# Patient Record
Sex: MALE | Race: WHITE | Employment: UNEMPLOYED | URBAN - NONMETROPOLITAN AREA
[De-identification: names, ages, dates, MRNs, and addresses within clinical notes are randomized per-mention and may not be internally consistent; named-entity substitution may affect disease eponyms.]

---

## 2019-02-27 ENCOUNTER — HOSPITAL ENCOUNTER (EMERGENCY)
Age: 29
Discharge: HOME | End: 2019-02-27
Attending: EMERGENCY MEDICINE
Payer: MEDICAID

## 2019-02-27 VITALS
TEMPERATURE: 98.7 F | SYSTOLIC BLOOD PRESSURE: 138 MMHG | DIASTOLIC BLOOD PRESSURE: 93 MMHG | RESPIRATION RATE: 17 BRPM | HEART RATE: 78 BPM | OXYGEN SATURATION: 98 % | BODY MASS INDEX: 20.89 KG/M2 | HEIGHT: 66 IN | WEIGHT: 130 LBS

## 2019-02-27 DIAGNOSIS — K52.9 GASTROENTERITIS: Primary | ICD-10-CM

## 2019-02-27 LAB
ALBUMIN SERPL-MCNC: 4.5 G/DL (ref 3.5–5.1)
ALP BLD-CCNC: 78 U/L (ref 38–126)
ALT SERPL-CCNC: 282 U/L (ref 11–66)
ANION GAP SERPL CALCULATED.3IONS-SCNC: 13 MEQ/L (ref 8–16)
AST SERPL-CCNC: 102 U/L (ref 5–40)
BASOPHILS # BLD: 0.6 %
BASOPHILS ABSOLUTE: 0.1 THOU/MM3 (ref 0–0.1)
BILIRUB SERPL-MCNC: 0.6 MG/DL (ref 0.3–1.2)
BILIRUBIN DIRECT: < 0.2 MG/DL (ref 0–0.3)
BUN BLDV-MCNC: 20 MG/DL (ref 7–22)
CALCIUM SERPL-MCNC: 9.6 MG/DL (ref 8.5–10.5)
CHLORIDE BLD-SCNC: 100 MEQ/L (ref 98–111)
CO2: 26 MEQ/L (ref 23–33)
CREAT SERPL-MCNC: 0.7 MG/DL (ref 0.4–1.2)
EKG ATRIAL RATE: 67 BPM
EKG P AXIS: 41 DEGREES
EKG P-R INTERVAL: 140 MS
EKG Q-T INTERVAL: 406 MS
EKG QRS DURATION: 96 MS
EKG QTC CALCULATION (BAZETT): 429 MS
EKG R AXIS: 113 DEGREES
EKG T AXIS: 50 DEGREES
EKG VENTRICULAR RATE: 67 BPM
EOSINOPHIL # BLD: 1.7 %
EOSINOPHILS ABSOLUTE: 0.1 THOU/MM3 (ref 0–0.4)
ERYTHROCYTE [DISTWIDTH] IN BLOOD BY AUTOMATED COUNT: 13.2 % (ref 11.5–14.5)
ERYTHROCYTE [DISTWIDTH] IN BLOOD BY AUTOMATED COUNT: 43 FL (ref 35–45)
ETHYL ALCOHOL, SERUM: < 0.01 %
GFR SERPL CREATININE-BSD FRML MDRD: > 90 ML/MIN/1.73M2
GLUCOSE BLD-MCNC: 110 MG/DL (ref 70–108)
HCT VFR BLD CALC: 45.2 % (ref 42–52)
HEMOGLOBIN: 15.2 GM/DL (ref 14–18)
IMMATURE GRANS (ABS): 0.02 THOU/MM3 (ref 0–0.07)
IMMATURE GRANULOCYTES: 0.2 %
LIPASE: 15.4 U/L (ref 5.6–51.3)
LYMPHOCYTES # BLD: 16.5 %
LYMPHOCYTES ABSOLUTE: 1.4 THOU/MM3 (ref 1–4.8)
MAGNESIUM: 1.7 MG/DL (ref 1.6–2.4)
MCH RBC QN AUTO: 30.3 PG (ref 26–33)
MCHC RBC AUTO-ENTMCNC: 33.6 GM/DL (ref 32.2–35.5)
MCV RBC AUTO: 90 FL (ref 80–94)
MONOCYTES # BLD: 7.3 %
MONOCYTES ABSOLUTE: 0.6 THOU/MM3 (ref 0.4–1.3)
NUCLEATED RED BLOOD CELLS: 0 /100 WBC
OSMOLALITY CALCULATION: 280.8 MOSMOL/KG (ref 275–300)
PLATELET # BLD: 247 THOU/MM3 (ref 130–400)
PMV BLD AUTO: 10.4 FL (ref 9.4–12.4)
POTASSIUM SERPL-SCNC: 4.4 MEQ/L (ref 3.5–5.2)
RBC # BLD: 5.02 MILL/MM3 (ref 4.7–6.1)
SEG NEUTROPHILS: 73.7 %
SEGMENTED NEUTROPHILS ABSOLUTE COUNT: 6.2 THOU/MM3 (ref 1.8–7.7)
SODIUM BLD-SCNC: 139 MEQ/L (ref 135–145)
T4 FREE: 1.24 NG/DL (ref 0.93–1.76)
TOTAL PROTEIN: 7.2 G/DL (ref 6.1–8)
TROPONIN T: < 0.01 NG/ML
TSH SERPL DL<=0.05 MIU/L-ACNC: 0.32 UIU/ML (ref 0.4–4.2)
WBC # BLD: 8.4 THOU/MM3 (ref 4.8–10.8)

## 2019-02-27 PROCEDURE — G0480 DRUG TEST DEF 1-7 CLASSES: HCPCS

## 2019-02-27 PROCEDURE — 82248 BILIRUBIN DIRECT: CPT

## 2019-02-27 PROCEDURE — 83690 ASSAY OF LIPASE: CPT

## 2019-02-27 PROCEDURE — 99284 EMERGENCY DEPT VISIT MOD MDM: CPT

## 2019-02-27 PROCEDURE — 85025 COMPLETE CBC W/AUTO DIFF WBC: CPT

## 2019-02-27 PROCEDURE — 80053 COMPREHEN METABOLIC PANEL: CPT

## 2019-02-27 PROCEDURE — 6370000000 HC RX 637 (ALT 250 FOR IP): Performed by: EMERGENCY MEDICINE

## 2019-02-27 PROCEDURE — 84484 ASSAY OF TROPONIN QUANT: CPT

## 2019-02-27 PROCEDURE — 84443 ASSAY THYROID STIM HORMONE: CPT

## 2019-02-27 PROCEDURE — 96374 THER/PROPH/DIAG INJ IV PUSH: CPT

## 2019-02-27 PROCEDURE — 36415 COLL VENOUS BLD VENIPUNCTURE: CPT

## 2019-02-27 PROCEDURE — 2580000003 HC RX 258: Performed by: EMERGENCY MEDICINE

## 2019-02-27 PROCEDURE — 93005 ELECTROCARDIOGRAM TRACING: CPT | Performed by: EMERGENCY MEDICINE

## 2019-02-27 PROCEDURE — 96375 TX/PRO/DX INJ NEW DRUG ADDON: CPT

## 2019-02-27 PROCEDURE — 83735 ASSAY OF MAGNESIUM: CPT

## 2019-02-27 PROCEDURE — 84439 ASSAY OF FREE THYROXINE: CPT

## 2019-02-27 RX ORDER — ONDANSETRON 4 MG/1
4 TABLET, ORALLY DISINTEGRATING ORAL ONCE
Status: COMPLETED | OUTPATIENT
Start: 2019-02-27 | End: 2019-02-27

## 2019-02-27 RX ORDER — 0.9 % SODIUM CHLORIDE 0.9 %
1000 INTRAVENOUS SOLUTION INTRAVENOUS ONCE
Status: COMPLETED | OUTPATIENT
Start: 2019-02-27 | End: 2019-02-27

## 2019-02-27 RX ORDER — ONDANSETRON 4 MG/1
4 TABLET, FILM COATED ORAL 3 TIMES DAILY PRN
Qty: 30 TABLET | Refills: 0 | Status: ON HOLD | OUTPATIENT
Start: 2019-02-27 | End: 2019-07-23

## 2019-02-27 RX ORDER — PANTOPRAZOLE SODIUM 40 MG/1
40 TABLET, DELAYED RELEASE ORAL ONCE
Status: COMPLETED | OUTPATIENT
Start: 2019-02-27 | End: 2019-02-27

## 2019-02-27 RX ORDER — PANTOPRAZOLE SODIUM 40 MG/1
40 TABLET, DELAYED RELEASE ORAL
Qty: 30 TABLET | Refills: 0 | Status: ON HOLD | OUTPATIENT
Start: 2019-02-27 | End: 2019-07-23

## 2019-02-27 RX ADMIN — PANTOPRAZOLE SODIUM 40 MG: 40 TABLET, DELAYED RELEASE ORAL at 21:36

## 2019-02-27 RX ADMIN — SODIUM CHLORIDE 1000 ML: 9 INJECTION, SOLUTION INTRAVENOUS at 21:36

## 2019-02-27 RX ADMIN — ONDANSETRON 4 MG: 4 TABLET, ORALLY DISINTEGRATING ORAL at 21:22

## 2019-02-27 ASSESSMENT — ENCOUNTER SYMPTOMS
VOICE CHANGE: 0
EYE ITCHING: 0
NAUSEA: 1
BLOOD IN STOOL: 0
RHINORRHEA: 0
ABDOMINAL PAIN: 0
EYE DISCHARGE: 0
EYE PAIN: 0
CONSTIPATION: 0
CHOKING: 0
PHOTOPHOBIA: 0
EYE REDNESS: 0
ABDOMINAL DISTENTION: 0
BACK PAIN: 0
WHEEZING: 0
DIARRHEA: 1
VOMITING: 1
SORE THROAT: 0
SINUS PRESSURE: 0
CHEST TIGHTNESS: 0
TROUBLE SWALLOWING: 0
COUGH: 0
SHORTNESS OF BREATH: 0

## 2019-02-27 ASSESSMENT — PAIN DESCRIPTION - ORIENTATION: ORIENTATION: LOWER

## 2019-02-27 ASSESSMENT — PAIN DESCRIPTION - LOCATION: LOCATION: ABDOMEN

## 2019-02-27 ASSESSMENT — PAIN DESCRIPTION - PAIN TYPE: TYPE: ACUTE PAIN

## 2019-02-27 ASSESSMENT — PAIN SCALES - GENERAL: PAINLEVEL_OUTOF10: 6

## 2019-02-28 PROCEDURE — 93010 ELECTROCARDIOGRAM REPORT: CPT | Performed by: NUCLEAR MEDICINE

## 2019-07-23 ENCOUNTER — HOSPITAL ENCOUNTER (OUTPATIENT)
Age: 29
Setting detail: OBSERVATION
Discharge: LEFT BEFORE DISCHARGE | End: 2019-07-24
Attending: EMERGENCY MEDICINE | Admitting: PSYCHIATRY & NEUROLOGY
Payer: MEDICAID

## 2019-07-23 DIAGNOSIS — F11.20 CONTINUOUS OPIOID DEPENDENCE (HCC): Primary | ICD-10-CM

## 2019-07-23 LAB
ALBUMIN SERPL-MCNC: 4.1 G/DL (ref 3.5–5.1)
ALP BLD-CCNC: 73 U/L (ref 38–126)
ALT SERPL-CCNC: 84 U/L (ref 11–66)
AMPHETAMINE+METHAMPHETAMINE URINE SCREEN: NEGATIVE
ANION GAP SERPL CALCULATED.3IONS-SCNC: 11 MEQ/L (ref 8–16)
AST SERPL-CCNC: 42 U/L (ref 5–40)
BARBITURATE QUANTITATIVE URINE: NEGATIVE
BASOPHILS # BLD: 0.8 %
BASOPHILS ABSOLUTE: 0.1 THOU/MM3 (ref 0–0.1)
BENZODIAZEPINE QUANTITATIVE URINE: POSITIVE
BILIRUB SERPL-MCNC: 0.5 MG/DL (ref 0.3–1.2)
BILIRUBIN DIRECT: < 0.2 MG/DL (ref 0–0.3)
BILIRUBIN URINE: ABNORMAL
BLOOD, URINE: NEGATIVE
BUN BLDV-MCNC: 20 MG/DL (ref 7–22)
CALCIUM SERPL-MCNC: 9 MG/DL (ref 8.5–10.5)
CANNABINOID QUANTITATIVE URINE: POSITIVE
CHARACTER, URINE: CLEAR
CHLORIDE BLD-SCNC: 105 MEQ/L (ref 98–111)
CO2: 26 MEQ/L (ref 23–33)
COCAINE METABOLITE QUANTITATIVE URINE: NEGATIVE
COLOR: YELLOW
CREAT SERPL-MCNC: 0.9 MG/DL (ref 0.4–1.2)
EOSINOPHIL # BLD: 2.8 %
EOSINOPHILS ABSOLUTE: 0.2 THOU/MM3 (ref 0–0.4)
ERYTHROCYTE [DISTWIDTH] IN BLOOD BY AUTOMATED COUNT: 12.8 % (ref 11.5–14.5)
ERYTHROCYTE [DISTWIDTH] IN BLOOD BY AUTOMATED COUNT: 43.2 FL (ref 35–45)
ETHYL ALCOHOL, SERUM: < 0.01 %
GFR SERPL CREATININE-BSD FRML MDRD: > 90 ML/MIN/1.73M2
GLUCOSE BLD-MCNC: 143 MG/DL (ref 70–108)
GLUCOSE URINE: NEGATIVE MG/DL
HCT VFR BLD CALC: 43.1 % (ref 42–52)
HEMOGLOBIN: 13.9 GM/DL (ref 14–18)
ICTOTEST: NEGATIVE
IMMATURE GRANS (ABS): 0.02 THOU/MM3 (ref 0–0.07)
IMMATURE GRANULOCYTES: 0.3 %
KETONES, URINE: NEGATIVE
LEUKOCYTE ESTERASE, URINE: NEGATIVE
LIPASE: 41.3 U/L (ref 5.6–51.3)
LYMPHOCYTES # BLD: 28.2 %
LYMPHOCYTES ABSOLUTE: 2.1 THOU/MM3 (ref 1–4.8)
MAGNESIUM: 2.1 MG/DL (ref 1.6–2.4)
MCH RBC QN AUTO: 30 PG (ref 26–33)
MCHC RBC AUTO-ENTMCNC: 32.3 GM/DL (ref 32.2–35.5)
MCV RBC AUTO: 92.9 FL (ref 80–94)
MONOCYTES # BLD: 7.2 %
MONOCYTES ABSOLUTE: 0.5 THOU/MM3 (ref 0.4–1.3)
NITRITE, URINE: NEGATIVE
NUCLEATED RED BLOOD CELLS: 0 /100 WBC
OPIATES, URINE: POSITIVE
OSMOLALITY CALCULATION: 288.2 MOSMOL/KG (ref 275–300)
OXYCODONE: NEGATIVE
PH UA: 6 (ref 5–9)
PHENCYCLIDINE QUANTITATIVE URINE: NEGATIVE
PLATELET # BLD: 254 THOU/MM3 (ref 130–400)
PMV BLD AUTO: 10.3 FL (ref 9.4–12.4)
POTASSIUM SERPL-SCNC: 4.6 MEQ/L (ref 3.5–5.2)
PROTEIN UA: NEGATIVE
RBC # BLD: 4.64 MILL/MM3 (ref 4.7–6.1)
SEG NEUTROPHILS: 60.7 %
SEGMENTED NEUTROPHILS ABSOLUTE COUNT: 4.6 THOU/MM3 (ref 1.8–7.7)
SODIUM BLD-SCNC: 142 MEQ/L (ref 135–145)
SPECIFIC GRAVITY, URINE: > 1.03 (ref 1–1.03)
TOTAL PROTEIN: 6.7 G/DL (ref 6.1–8)
TSH SERPL DL<=0.05 MIU/L-ACNC: 0.56 UIU/ML (ref 0.4–4.2)
UROBILINOGEN, URINE: 1 EU/DL (ref 0–1)
WBC # BLD: 7.5 THOU/MM3 (ref 4.8–10.8)

## 2019-07-23 PROCEDURE — 86803 HEPATITIS C AB TEST: CPT

## 2019-07-23 PROCEDURE — 99285 EMERGENCY DEPT VISIT HI MDM: CPT

## 2019-07-23 PROCEDURE — G0378 HOSPITAL OBSERVATION PER HR: HCPCS

## 2019-07-23 PROCEDURE — 83735 ASSAY OF MAGNESIUM: CPT

## 2019-07-23 PROCEDURE — 87522 HEPATITIS C REVRS TRNSCRPJ: CPT

## 2019-07-23 PROCEDURE — 82248 BILIRUBIN DIRECT: CPT

## 2019-07-23 PROCEDURE — 83690 ASSAY OF LIPASE: CPT

## 2019-07-23 PROCEDURE — 85025 COMPLETE CBC W/AUTO DIFF WBC: CPT

## 2019-07-23 PROCEDURE — 80307 DRUG TEST PRSMV CHEM ANLYZR: CPT

## 2019-07-23 PROCEDURE — 1200000003 HC TELEMETRY R&B

## 2019-07-23 PROCEDURE — 84443 ASSAY THYROID STIM HORMONE: CPT

## 2019-07-23 PROCEDURE — G0480 DRUG TEST DEF 1-7 CLASSES: HCPCS

## 2019-07-23 PROCEDURE — 36415 COLL VENOUS BLD VENIPUNCTURE: CPT

## 2019-07-23 PROCEDURE — 80053 COMPREHEN METABOLIC PANEL: CPT

## 2019-07-23 PROCEDURE — 81003 URINALYSIS AUTO W/O SCOPE: CPT

## 2019-07-23 RX ORDER — BUPRENORPHINE 2 MG/1
2 TABLET SUBLINGUAL PRN
Status: DISCONTINUED | OUTPATIENT
Start: 2019-07-23 | End: 2019-07-24 | Stop reason: HOSPADM

## 2019-07-23 RX ORDER — PROMETHAZINE HYDROCHLORIDE 25 MG/1
25 TABLET ORAL EVERY 6 HOURS PRN
Status: DISCONTINUED | OUTPATIENT
Start: 2019-07-23 | End: 2019-07-24 | Stop reason: HOSPADM

## 2019-07-23 RX ORDER — TRAZODONE HYDROCHLORIDE 50 MG/1
50 TABLET ORAL NIGHTLY PRN
Status: DISCONTINUED | OUTPATIENT
Start: 2019-07-24 | End: 2019-07-24 | Stop reason: HOSPADM

## 2019-07-23 RX ORDER — DICYCLOMINE HCL 20 MG
20 TABLET ORAL EVERY 6 HOURS PRN
Status: DISCONTINUED | OUTPATIENT
Start: 2019-07-23 | End: 2019-07-24 | Stop reason: HOSPADM

## 2019-07-23 RX ORDER — CLONIDINE HYDROCHLORIDE 0.1 MG/1
0.1 TABLET ORAL PRN
Status: DISCONTINUED | OUTPATIENT
Start: 2019-07-23 | End: 2019-07-24 | Stop reason: HOSPADM

## 2019-07-23 RX ORDER — GABAPENTIN 300 MG/1
300 CAPSULE ORAL EVERY 8 HOURS PRN
Status: DISCONTINUED | OUTPATIENT
Start: 2019-07-23 | End: 2019-07-24 | Stop reason: HOSPADM

## 2019-07-23 RX ORDER — IBUPROFEN 800 MG/1
800 TABLET ORAL EVERY 8 HOURS PRN
Status: DISCONTINUED | OUTPATIENT
Start: 2019-07-23 | End: 2019-07-24 | Stop reason: HOSPADM

## 2019-07-23 RX ORDER — HYDROXYZINE PAMOATE 50 MG/1
50 CAPSULE ORAL EVERY 8 HOURS PRN
Status: DISCONTINUED | OUTPATIENT
Start: 2019-07-23 | End: 2019-07-24 | Stop reason: HOSPADM

## 2019-07-23 ASSESSMENT — ENCOUNTER SYMPTOMS
SINUS PRESSURE: 0
ABDOMINAL PAIN: 0
CHEST TIGHTNESS: 0
EYE DISCHARGE: 0
BACK PAIN: 0
DIARRHEA: 0
VOICE CHANGE: 0
EYE PAIN: 0
TROUBLE SWALLOWING: 0
SHORTNESS OF BREATH: 0
CONSTIPATION: 0
PHOTOPHOBIA: 0
EYE REDNESS: 0
COUGH: 0
WHEEZING: 0
EYE ITCHING: 0
CHOKING: 0
SORE THROAT: 0
VOMITING: 0
BLOOD IN STOOL: 0
RHINORRHEA: 0
NAUSEA: 0
ABDOMINAL DISTENTION: 0

## 2019-07-23 ASSESSMENT — PAIN SCALES - GENERAL
PAINLEVEL_OUTOF10: 4
PAINLEVEL_OUTOF10: 0

## 2019-07-23 ASSESSMENT — PAIN DESCRIPTION - LOCATION: LOCATION: BACK

## 2019-07-23 ASSESSMENT — PAIN DESCRIPTION - PAIN TYPE: TYPE: CHRONIC PAIN

## 2019-07-23 ASSESSMENT — PAIN DESCRIPTION - ORIENTATION: ORIENTATION: LOWER

## 2019-07-24 VITALS
OXYGEN SATURATION: 97 % | WEIGHT: 128.6 LBS | HEART RATE: 82 BPM | HEIGHT: 65 IN | RESPIRATION RATE: 18 BRPM | TEMPERATURE: 98.6 F | SYSTOLIC BLOOD PRESSURE: 120 MMHG | DIASTOLIC BLOOD PRESSURE: 80 MMHG | BODY MASS INDEX: 21.43 KG/M2

## 2019-07-24 LAB
ALBUMIN SERPL-MCNC: 3.7 G/DL (ref 3.5–5.1)
ALP BLD-CCNC: 72 U/L (ref 38–126)
ALT SERPL-CCNC: 78 U/L (ref 11–66)
ANION GAP SERPL CALCULATED.3IONS-SCNC: 8 MEQ/L (ref 8–16)
AST SERPL-CCNC: 37 U/L (ref 5–40)
BASOPHILS # BLD: 0.9 %
BASOPHILS ABSOLUTE: 0.1 THOU/MM3 (ref 0–0.1)
BILIRUB SERPL-MCNC: 0.5 MG/DL (ref 0.3–1.2)
BUN BLDV-MCNC: 19 MG/DL (ref 7–22)
CALCIUM SERPL-MCNC: 8.8 MG/DL (ref 8.5–10.5)
CHLORIDE BLD-SCNC: 107 MEQ/L (ref 98–111)
CO2: 28 MEQ/L (ref 23–33)
CREAT SERPL-MCNC: 0.9 MG/DL (ref 0.4–1.2)
EOSINOPHIL # BLD: 3 %
EOSINOPHILS ABSOLUTE: 0.2 THOU/MM3 (ref 0–0.4)
ERYTHROCYTE [DISTWIDTH] IN BLOOD BY AUTOMATED COUNT: 12.7 % (ref 11.5–14.5)
ERYTHROCYTE [DISTWIDTH] IN BLOOD BY AUTOMATED COUNT: 42.6 FL (ref 35–45)
GFR SERPL CREATININE-BSD FRML MDRD: > 90 ML/MIN/1.73M2
GLUCOSE BLD-MCNC: 128 MG/DL (ref 70–108)
HCT VFR BLD CALC: 41.9 % (ref 42–52)
HEMOGLOBIN: 13.8 GM/DL (ref 14–18)
HEPATITIS C ANTIBODY: ABNORMAL
IMMATURE GRANS (ABS): 0.01 THOU/MM3 (ref 0–0.07)
IMMATURE GRANULOCYTES: 0.1 %
LYMPHOCYTES # BLD: 34.2 %
LYMPHOCYTES ABSOLUTE: 2.4 THOU/MM3 (ref 1–4.8)
MCH RBC QN AUTO: 30.3 PG (ref 26–33)
MCHC RBC AUTO-ENTMCNC: 32.9 GM/DL (ref 32.2–35.5)
MCV RBC AUTO: 92.1 FL (ref 80–94)
MONOCYTES # BLD: 6.2 %
MONOCYTES ABSOLUTE: 0.4 THOU/MM3 (ref 0.4–1.3)
NUCLEATED RED BLOOD CELLS: 0 /100 WBC
PLATELET # BLD: 232 THOU/MM3 (ref 130–400)
PMV BLD AUTO: 10.3 FL (ref 9.4–12.4)
POTASSIUM SERPL-SCNC: 4.6 MEQ/L (ref 3.5–5.2)
RBC # BLD: 4.55 MILL/MM3 (ref 4.7–6.1)
SEG NEUTROPHILS: 55.6 %
SEGMENTED NEUTROPHILS ABSOLUTE COUNT: 3.9 THOU/MM3 (ref 1.8–7.7)
SODIUM BLD-SCNC: 143 MEQ/L (ref 135–145)
TOTAL PROTEIN: 6.2 G/DL (ref 6.1–8)
TSH SERPL DL<=0.05 MIU/L-ACNC: 0.53 UIU/ML (ref 0.4–4.2)
WBC # BLD: 7 THOU/MM3 (ref 4.8–10.8)

## 2019-07-24 PROCEDURE — 99219 PR INITIAL OBSERVATION CARE/DAY 50 MINUTES: CPT | Performed by: PHYSICIAN ASSISTANT

## 2019-07-24 PROCEDURE — 6370000000 HC RX 637 (ALT 250 FOR IP): Performed by: PSYCHIATRY & NEUROLOGY

## 2019-07-24 PROCEDURE — G0378 HOSPITAL OBSERVATION PER HR: HCPCS

## 2019-07-24 RX ORDER — NICOTINE 21 MG/24HR
1 PATCH, TRANSDERMAL 24 HOURS TRANSDERMAL DAILY
Status: DISCONTINUED | OUTPATIENT
Start: 2019-07-24 | End: 2019-07-24 | Stop reason: HOSPADM

## 2019-07-24 RX ADMIN — Medication 2 MG: at 00:16

## 2019-07-24 RX ADMIN — HYDROXYZINE PAMOATE 50 MG: 50 CAPSULE ORAL at 08:40

## 2019-07-24 RX ADMIN — IBUPROFEN 800 MG: 800 TABLET, FILM COATED ORAL at 00:16

## 2019-07-24 RX ADMIN — GABAPENTIN 300 MG: 300 CAPSULE ORAL at 00:17

## 2019-07-24 RX ADMIN — CLONIDINE HYDROCHLORIDE 0.1 MG: 0.1 TABLET ORAL at 00:16

## 2019-07-24 RX ADMIN — PROMETHAZINE HYDROCHLORIDE 25 MG: 25 TABLET ORAL at 00:17

## 2019-07-24 RX ADMIN — GABAPENTIN 300 MG: 300 CAPSULE ORAL at 08:40

## 2019-07-24 RX ADMIN — HYDROXYZINE PAMOATE 50 MG: 50 CAPSULE ORAL at 00:17

## 2019-07-24 RX ADMIN — IBUPROFEN 800 MG: 800 TABLET, FILM COATED ORAL at 08:41

## 2019-07-24 ASSESSMENT — PAIN DESCRIPTION - ORIENTATION
ORIENTATION: LOWER
ORIENTATION: RIGHT;LEFT
ORIENTATION: LOWER

## 2019-07-24 ASSESSMENT — PAIN SCALES - GENERAL
PAINLEVEL_OUTOF10: 0
PAINLEVEL_OUTOF10: 4
PAINLEVEL_OUTOF10: 0
PAINLEVEL_OUTOF10: 0
PAINLEVEL_OUTOF10: 2
PAINLEVEL_OUTOF10: 0
PAINLEVEL_OUTOF10: 0

## 2019-07-24 ASSESSMENT — PAIN DESCRIPTION - ONSET
ONSET: ON-GOING

## 2019-07-24 ASSESSMENT — PAIN DESCRIPTION - DESCRIPTORS
DESCRIPTORS: ACHING

## 2019-07-24 ASSESSMENT — PAIN DESCRIPTION - PAIN TYPE
TYPE: ACUTE PAIN
TYPE: ACUTE PAIN

## 2019-07-24 ASSESSMENT — PAIN - FUNCTIONAL ASSESSMENT
PAIN_FUNCTIONAL_ASSESSMENT: ACTIVITIES ARE NOT PREVENTED

## 2019-07-24 ASSESSMENT — PAIN DESCRIPTION - DIRECTION
RADIATING_TOWARDS: LOW BACK
RADIATING_TOWARDS: NOT RADIATING

## 2019-07-24 ASSESSMENT — PAIN DESCRIPTION - PROGRESSION
CLINICAL_PROGRESSION: NOT CHANGED

## 2019-07-24 ASSESSMENT — PAIN DESCRIPTION - LOCATION
LOCATION: ANKLE;BACK
LOCATION: BACK
LOCATION: BACK

## 2019-07-24 ASSESSMENT — PAIN DESCRIPTION - FREQUENCY
FREQUENCY: INTERMITTENT

## 2019-07-24 NOTE — ED PROVIDER NOTES
status: Not on file    Intimate partner violence:     Fear of current or ex partner: Not on file     Emotionally abused: Not on file     Physically abused: Not on file     Forced sexual activity: Not on file   Other Topics Concern    Not on file   Social History Narrative    Not on file       PHYSICAL EXAM     INITIAL VITALS:  height is 5' 5\" (1.651 m) and weight is 128 lb 9.6 oz (58.3 kg). His oral temperature is 98.1 °F (36.7 °C). His blood pressure is 143/87 (abnormal) and his pulse is 89. His respiration is 16 and oxygen saturation is 99%. Physical Exam   Constitutional: He is oriented to person, place, and time. He appears well-developed and well-nourished. No distress. HENT:   Head: Normocephalic and atraumatic. Right Ear: External ear normal.   Left Ear: External ear normal.   Nose: Nose normal.   Mouth/Throat: Oropharynx is clear and moist. No oropharyngeal exudate. Eyes: Pupils are equal, round, and reactive to light. Conjunctivae and EOM are normal. Right eye exhibits no discharge. Left eye exhibits no discharge. No scleral icterus. Neck: Normal range of motion. Neck supple. No JVD present. No tracheal deviation present. Cardiovascular: Normal rate, regular rhythm, normal heart sounds and intact distal pulses. Exam reveals no gallop and no friction rub. No murmur heard. Pulmonary/Chest: Effort normal and breath sounds normal. He has no wheezes. He has no rales. Abdominal: Soft. Bowel sounds are normal. He exhibits no mass. There is no tenderness. There is no rebound and no guarding. Musculoskeletal: Normal range of motion. He exhibits no edema or tenderness. Lymphadenopathy:     He has no cervical adenopathy. Neurological: He is alert and oriented to person, place, and time. He has normal reflexes. He displays normal reflexes. No cranial nerve deficit. He exhibits normal muscle tone. Coordination normal.   Skin: Skin is warm and dry. No rash noted. He is not diaphoretic.

## 2019-07-24 NOTE — ED TRIAGE NOTES
Presents to ED for detox of heroin and fentanyl. Pt states he last used at 1100 today and has not used since. Pt states he has been clean for several years due to his job, but he became weak today. States he feels the urge to use again and he wants help.

## 2019-07-24 NOTE — PLAN OF CARE
Problem: Coping - Ineffective, Individual:  Goal: Ability to identify and develop effective coping behavior will improve  Description  Ability to identify and develop effective coping behavior will improve  Outcome: Met This Shift  Open communication between nurse and patient  patient able to verbalize needs  Problem: Injury - Risk of, Substance Overdose:  Goal: Ability to remain free from injury will improve  Description  Ability to remain free from injury will improve  Outcome: Met This Shift  No injuries noted this shift   Problem: Mood - Altered:  Goal: Mood stable  Description  Mood stable  Outcome: Met This Shift   Mood rated 5/10.

## 2019-07-24 NOTE — H&P
Department of Psychiatry   Opioid Detoxification and Linkage   History and Physical Exam    CHIEF COMPLAINT:  Opioid Dependence    History obtained from:  patient, electronic medical record and family members    HISTORY OF PRESENT ILLNESS:    Yuni Amaya is a 26 y.o. male with a history of severe opioid dependence and depression who is admitted for medically assisted treatment of opiate dependence, as attempts at home detox were unmanageable and unsafe. He had an opiate overdose the day before he came here, was treated at [facility]. Opioid use started approximately 2 years ago. Recent daily use has been approximately 0.25 gm/d via IN route of administration. Last use was yesterday at 11am. Never used IV (+Hepatitic C antibody per testing yesterday). Longest period of sobriety was about a few months duration. He does not want Subutex or Suboxone, is considering Vivitrol. He states that he has seen psychiatry in the past for depression and anxiety. He has not had treatment for a few years and states that he has been stable. He would, however, like to get reconnected with a provider and a therapist  after discharge.     Patients opioid use has been causing significant stress and impairment in life, characterized by :  - larger amount of opioid used by the patient and whenever possible, using substances for longer duration  - patient has made attempts, unsuccessfully, to stop or cut down the abuse of opioid, in the past  - significant time is spent, by the patient, to obtain the drugs, almost every day  - patient is craving for opioids  - opioid abuse has significantly affected patient's relationship, social life, interpersonal relationship  - patient's social and recreational life has been negatively affected, secondary to opioid abuse  - patient continues to abuse opioid, despite the knowledge that it is affecting physical and psychological life  - patient has developed tolerance, as manifested by increased  Depression         Inpatient Detox recommended due to inability to safely manage detox in an outpatient setting or any lower level of care    TREATMENT PLAN    Detox with Clonidine only via COWS scoring   Patient was educated on the medications that were going to be used for detox. Risks vs benefits were discussed with the patient. Patient provided informed consent for these medications. Patient will work with social work and staff for aftercare/discharge plans: Dr. Kristen Quinteros for possible Vivitrol, +/- Dr. Maria Luz Mason vs [address] for mental health   Continue appropriate home meds. Estimated length of stay:  2-5 days  GENERAL PATIENT/FAMILY EDUCATION  Patient will understand basic signs and symptoms, Patient will understand benefits/risks and potential side effects from proposed meds and Patient will understand their role in recovery. Family is active in patient's care. Patient assets that may be helpful during treatment include: Intent to participate and engage in treatment, sufficient fund of knowledge and intellect to understand and utilize treatments.      Electronically signed by Joyce Rdz PA-C on 7/24/19 at 11:52 AM

## 2019-07-27 LAB
HCV QNT BY NAAT INTERPRETATION: DETECTED
HCV QNT BY NAAT IU/ML: ABNORMAL
HCV QNT BY NAAT LOG IU/ML: 5.75 LOG IU/ML

## 2019-09-29 ENCOUNTER — HOSPITAL ENCOUNTER (EMERGENCY)
Age: 29
Discharge: HOME | End: 2019-09-29
Attending: EMERGENCY MEDICINE
Payer: MEDICAID

## 2019-09-29 VITALS
RESPIRATION RATE: 12 BRPM | BODY MASS INDEX: 23.04 KG/M2 | SYSTOLIC BLOOD PRESSURE: 142 MMHG | OXYGEN SATURATION: 99 % | DIASTOLIC BLOOD PRESSURE: 88 MMHG | HEART RATE: 98 BPM | HEIGHT: 63 IN | TEMPERATURE: 98.1 F | WEIGHT: 130 LBS

## 2019-09-29 DIAGNOSIS — Z53.20 PATIENT LEFT BEFORE TREATMENT COMPLETED: ICD-10-CM

## 2019-09-29 DIAGNOSIS — Z59.00 HOMELESSNESS: ICD-10-CM

## 2019-09-29 DIAGNOSIS — F19.10 POLYSUBSTANCE ABUSE (HCC): Primary | ICD-10-CM

## 2019-09-29 LAB
AMPHETAMINE+METHAMPHETAMINE URINE SCREEN: NEGATIVE
BARBITURATE QUANTITATIVE URINE: NEGATIVE
BENZODIAZEPINE QUANTITATIVE URINE: POSITIVE
CANNABINOID QUANTITATIVE URINE: POSITIVE
COCAINE METABOLITE QUANTITATIVE URINE: POSITIVE
OPIATES, URINE: POSITIVE
OXYCODONE: NEGATIVE
PHENCYCLIDINE QUANTITATIVE URINE: NEGATIVE

## 2019-09-29 PROCEDURE — 80307 DRUG TEST PRSMV CHEM ANLYZR: CPT

## 2019-09-29 PROCEDURE — 99283 EMERGENCY DEPT VISIT LOW MDM: CPT

## 2019-09-29 SDOH — ECONOMIC STABILITY - HOUSING INSECURITY: HOMELESSNESS UNSPECIFIED: Z59.00

## 2019-09-29 ASSESSMENT — PAIN DESCRIPTION - DESCRIPTORS: DESCRIPTORS: OTHER (COMMENT)

## 2019-09-29 ASSESSMENT — ENCOUNTER SYMPTOMS
NAUSEA: 0
WHEEZING: 0
SHORTNESS OF BREATH: 0
ABDOMINAL PAIN: 0
COUGH: 0
SORE THROAT: 0
BACK PAIN: 0
EYE REDNESS: 0
VOMITING: 0
RHINORRHEA: 1
EYE DISCHARGE: 0
DIARRHEA: 0

## 2019-09-29 ASSESSMENT — PAIN SCALES - GENERAL: PAINLEVEL_OUTOF10: 6

## 2019-09-29 ASSESSMENT — PAIN DESCRIPTION - LOCATION: LOCATION: GENERALIZED

## 2019-09-29 ASSESSMENT — PAIN DESCRIPTION - FREQUENCY: FREQUENCY: CONTINUOUS

## 2020-10-26 ENCOUNTER — HOSPITAL ENCOUNTER (OUTPATIENT)
Dept: INTERVENTIONAL RADIOLOGY/VASCULAR | Age: 30
Discharge: HOME | End: 2020-10-26
Payer: MEDICAID

## 2020-10-26 PROCEDURE — 93971 EXTREMITY STUDY: CPT

## 2021-06-24 ENCOUNTER — NURSE TRIAGE (OUTPATIENT)
Dept: OTHER | Facility: CLINIC | Age: 31
End: 2021-06-24

## 2021-06-24 NOTE — TELEPHONE ENCOUNTER
Patient called [facility] with red flag complaint to establish care with Devin Santos. Brief description of triage: Pt states he has been having upper and lower extremity numbness, tingling and weakness for the past year that seems to be getting worse over the last month. Pt asked was there something acute that changed and pt stated he is falling apart and did not state anything was different. Pt states he is going to ER but wanted to make an appt with a physician. See assessment below. Pt did hang up during transfer to [facility] for scheduling. Writer attempted to call pt back he was not at number provided. Triage indicates for patient to be seen in the next 3 days. Pt advised if symptoms are acute to go to ED or [facility]. Care advice provided, patient verbalizes understanding; denies any other questions or concerns; instructed to call back for any new or worsening symptoms. Attention Provider: Thank you for allowing me to participate in the care of your patient. The patient was connected to triage in response to information provided to the [facility]. Please do not respond through this encounter as the response is not directed to a shared pool. Reason for Disposition   Weakness of arm or leg is a chronic symptom (recurrent or ongoing problem lasting > 4 weeks)    Answer Assessment - Initial Assessment Questions  1. SYMPTOM: \"What is the main symptom you are concerned about? \" (e.g., weakness, numbness)      Numbness weakness and pain in bilateral legs and arms    2. ONSET: \"When did this start? \" (minutes, hours, days; while sleeping)      About year a ago but is now getting worse about a month ago    3. LAST NORMAL: \"When was the last time you were normal (no symptoms)? \"      Year ago    4. PATTERN \"Does this come and go, or has it been constant since it started? \"  \"Is it present now? \"      Come and go in severity    5.  CARDIAC SYMPTOMS: \"Have you had any of the following symptoms: chest pain,

## 2021-06-28 ENCOUNTER — TELEPHONE (OUTPATIENT)
Dept: FAMILY MEDICINE CLINIC | Age: 31
End: 2021-06-28

## 2021-06-29 ENCOUNTER — TELEPHONE (OUTPATIENT)
Dept: FAMILY MEDICINE CLINIC | Age: 31
End: 2021-06-29

## 2021-06-30 NOTE — TELEPHONE ENCOUNTER
Spoke with pt states he would like to reschedule, then we were disconnected. I called back and got   LM to call the office to get rescheduled.

## 2021-10-20 ENCOUNTER — OFFICE VISIT (OUTPATIENT)
Dept: FAMILY MEDICINE CLINIC | Age: 31
End: 2021-10-20
Payer: MEDICAID

## 2021-10-20 VITALS
DIASTOLIC BLOOD PRESSURE: 80 MMHG | OXYGEN SATURATION: 98 % | WEIGHT: 122.4 LBS | BODY MASS INDEX: 21.69 KG/M2 | HEIGHT: 63 IN | RESPIRATION RATE: 18 BRPM | HEART RATE: 112 BPM | SYSTOLIC BLOOD PRESSURE: 124 MMHG | TEMPERATURE: 98 F

## 2021-10-20 DIAGNOSIS — R53.83 FATIGUE, UNSPECIFIED TYPE: ICD-10-CM

## 2021-10-20 DIAGNOSIS — R63.4 WEIGHT LOSS: Primary | ICD-10-CM

## 2021-10-20 DIAGNOSIS — M25.50 ARTHRALGIA, UNSPECIFIED JOINT: ICD-10-CM

## 2021-10-20 DIAGNOSIS — M54.32 BILATERAL SCIATICA: ICD-10-CM

## 2021-10-20 DIAGNOSIS — M54.31 BILATERAL SCIATICA: ICD-10-CM

## 2021-10-20 PROCEDURE — G8484 FLU IMMUNIZE NO ADMIN: HCPCS | Performed by: NURSE PRACTITIONER

## 2021-10-20 PROCEDURE — 99214 OFFICE O/P EST MOD 30 MIN: CPT | Performed by: NURSE PRACTITIONER

## 2021-10-20 PROCEDURE — G8420 CALC BMI NORM PARAMETERS: HCPCS | Performed by: NURSE PRACTITIONER

## 2021-10-20 PROCEDURE — 4004F PT TOBACCO SCREEN RCVD TLK: CPT | Performed by: NURSE PRACTITIONER

## 2021-10-20 PROCEDURE — 96372 THER/PROPH/DIAG INJ SC/IM: CPT | Performed by: NURSE PRACTITIONER

## 2021-10-20 PROCEDURE — G8427 DOCREV CUR MEDS BY ELIG CLIN: HCPCS | Performed by: NURSE PRACTITIONER

## 2021-10-20 RX ORDER — GABAPENTIN 100 MG/1
CAPSULE ORAL
Qty: 63 CAPSULE | Refills: 0 | Status: SHIPPED | OUTPATIENT
Start: 2021-10-20 | End: 2022-07-26 | Stop reason: SDUPTHER

## 2021-10-20 RX ORDER — PREDNISONE 20 MG/1
40 TABLET ORAL DAILY
Qty: 14 TABLET | Refills: 0 | Status: SHIPPED | OUTPATIENT
Start: 2021-10-20 | End: 2021-10-27

## 2021-10-20 RX ORDER — METHYLPREDNISOLONE ACETATE 40 MG/ML
60 INJECTION, SUSPENSION INTRA-ARTICULAR; INTRALESIONAL; INTRAMUSCULAR; SOFT TISSUE ONCE
Status: COMPLETED | OUTPATIENT
Start: 2021-10-20 | End: 2021-10-20

## 2021-10-20 RX ORDER — GABAPENTIN 800 MG/1
800 TABLET ORAL PRN
COMMUNITY
End: 2021-10-20

## 2021-10-20 RX ADMIN — METHYLPREDNISOLONE ACETATE 60 MG: 40 INJECTION, SUSPENSION INTRA-ARTICULAR; INTRALESIONAL; INTRAMUSCULAR; SOFT TISSUE at 14:44

## 2021-10-20 SDOH — ECONOMIC STABILITY: FOOD INSECURITY: WITHIN THE PAST 12 MONTHS, YOU WORRIED THAT YOUR FOOD WOULD RUN OUT BEFORE YOU GOT MONEY TO BUY MORE.: NEVER TRUE

## 2021-10-20 SDOH — ECONOMIC STABILITY: FOOD INSECURITY: WITHIN THE PAST 12 MONTHS, THE FOOD YOU BOUGHT JUST DIDN'T LAST AND YOU DIDN'T HAVE MONEY TO GET MORE.: NEVER TRUE

## 2021-10-20 ASSESSMENT — PATIENT HEALTH QUESTIONNAIRE - PHQ9
SUM OF ALL RESPONSES TO PHQ9 QUESTIONS 1 & 2: 0
1. LITTLE INTEREST OR PLEASURE IN DOING THINGS: 0
SUM OF ALL RESPONSES TO PHQ QUESTIONS 1-9: 0
SUM OF ALL RESPONSES TO PHQ QUESTIONS 1-9: 0
2. FEELING DOWN, DEPRESSED OR HOPELESS: 0
SUM OF ALL RESPONSES TO PHQ QUESTIONS 1-9: 0

## 2021-10-20 ASSESSMENT — SOCIAL DETERMINANTS OF HEALTH (SDOH): HOW HARD IS IT FOR YOU TO PAY FOR THE VERY BASICS LIKE FOOD, HOUSING, MEDICAL CARE, AND HEATING?: NOT HARD AT ALL

## 2021-10-20 NOTE — PROGRESS NOTES
[facility] [address]  [address] [address]  Dept: [phone]  Loc: [phone]  Visit Date: 10/20/2021      Chief Complaint:   Pablito Harris is a 27 y.o. male thatpresents for Weight Loss (unsure of the cause), Joint Pain, and Back Pain        HPI:  Varying weight by 20-30 lbs over the last year  No changes in appetite, reports eating well  Notes night sweats, fatigue, diffuse joint pain  +RUQ pain, swells at times    Back pain  Radiates down back of legs to toes  No weakness  Works as   No known injury    Reports diffuse joint pain  Takes Gabapentin as needed, gets it off the street  No injury that he knows of    The patient comes in alone today. History obtained from pt and medical records. I have reviewed the patient's past medical history, past surgical history, allergies,medications, social and family history and I have made updates where appropriate. Past Medical History:   Diagnosis Date    Depression        History reviewed. No pertinent surgical history. Social History     Tobacco Use    Smoking status: Current Every Day Smoker     Packs/day: 0.50     Years: 5.00     Pack years: 2.50     Types: Cigarettes    Smokeless tobacco: Never Used   Substance Use Topics    Alcohol use: No    Drug use: Yes     Types: Marijuana     Comment: hx heroin and fentanyl in the past, clean now       History reviewed. No pertinent family history. PHYSICAL EXAM:  /80   Pulse 112   Temp 98 °F (36.7 °C) (Infrared)   Resp 18   Ht 5' 3\" (1.6 m)   Wt 122 lb 6.4 oz (55.5 kg)   SpO2 98%   BMI 21.68 kg/m²     Physical Exam  Constitutional:       Appearance: Normal appearance. HENT:      Head: Normocephalic and atraumatic.       Right Ear: External ear normal.      Left Ear: External ear normal.      Nose: Nose normal.      Mouth/Throat:      Mouth: Mucous membranes are moist.   Eyes:      Conjunctiva/sclera: Conjunctivae normal. Cardiovascular:      Rate and Rhythm: Normal rate and regular rhythm. Pulses: Normal pulses. Heart sounds: Normal heart sounds. Pulmonary:      Effort: Pulmonary effort is normal.      Breath sounds: Normal breath sounds. Abdominal:      General: Bowel sounds are normal.      Palpations: Abdomen is soft. Tenderness: There is abdominal tenderness in the right upper quadrant. Musculoskeletal:         General: Normal range of motion. Cervical back: Normal range of motion. Skin:     General: Skin is warm and dry. Neurological:      General: No focal deficit present. Mental Status: He is alert and oriented to person, place, and time. Psychiatric:         Mood and Affect: Mood normal.         Behavior: Behavior normal.         ASSESSMENT & PLAN  [place] was seen today for weight loss, joint pain and back pain. Diagnoses and all orders for this visit:    Weight loss  -     CBC Auto Differential; Future  -     Comprehensive Metabolic Panel; Future  -     TSH with Reflex; Future  -     SIRISHA - Geri Saldaña MD, Gastroenterology, [facility]; Future    Fatigue, unspecified type  -     CBC Auto Differential; Future  -     Comprehensive Metabolic Panel; Future  -     TSH with Reflex; Future  -     SIRISHA - Geri Saldaña MD, Gastroenterology, [facility]; Future    Arthralgia, unspecified joint  -     gabapentin (NEURONTIN) 100 MG capsule; Take 1 tab PO qhs x 1 week, then take 1 tab PO BID x 1 week, then take 1 tab PO TID. -     predniSONE (DELTASONE) 20 MG tablet; Take 2 tablets by mouth daily for 7 days  -     methylPREDNISolone acetate (DEPO-MEDROL) injection 60 mg    Bilateral sciatica  -     predniSONE (DELTASONE) 20 MG tablet;  Take 2 tablets by mouth daily for 7 days  -     methylPREDNISolone acetate (DEPO-MEDROL) injection 60 mg      During review of chart, discovered positive Hep C test in 2019  With this and his RUQ pain and swelling, will refer to Dr. Sanjuanita Blank  Will need further work up and treatment     Treat back and joint pain with steroids   Ice prn  Heating pad prn  Ibuprofen BID with food  Push fluids    Check labs    Stop prn Gabapentin from friends  Will start a rx for this   Instructed him to take it consistently and as ordered  Will re-evaluate dose in 3 1/2 weeks    No follow-ups on file. Chico received counseling on the following healthy behaviors: nutrition, exercise and medication adherence  Reviewedprior labs and health maintenance. Continue current medications, diet and exercise. Discussed use, benefit, and side effects of prescribed medications. Barriers to medication compliance addressed. Patient given educationalmaterials - see patient instructions. All patient questions answered. Patient voiced understanding.      Electronically signed by KATINA Vital - CNP, APRN on 10/20/21 at 1:06 PM EDT

## 2021-10-20 NOTE — PATIENT INSTRUCTIONS
Needs followup with me in 3.5 weeks      Patient Education        Learning About Hepatitis C  What is hepatitis C? Hepatitis C is a disease caused by a virus that infects the liver. In time, it can lead to cirrhosis, liver cancer, or liver failure. Many people don't know that they have the virus until they already have some liver damage. This can take many years. Some people who get the infection have it for a short time (acute) and then get better. But most people who have it go on to develop long-term, or chronic, infection. Although hepatitis C can be very serious, most people can manage it and lead active, full lives. What happens when you have hepatitis C? Some people who get hepatitis C have it for a short time (acute infection) and then get better. But most people get long-term, or chronic, infection. This can lead to liver damage. Long-term hepatitis C often causes tiny scars in your liver. If you have a lot of scars, it becomes hard for your liver to work well. Over time, some people have more serious problems such as cirrhosis or liver cancer. What are the symptoms? Most people who have hepatitis C don't have symptoms. If there are symptoms, they may include fatigue, pain in the belly and joints, itchy skin, sore muscles, and dark urine. There may also be jaundice. This is a condition in which the skin and the whites of the eyes look yellow. How can you prevent hepatitis C? There is no vaccine to prevent the disease. Anyone who has hepatitis C can spread the virus to someone else. You can take steps to make infection less likely. · Don't share needles to inject drugs. · Make sure all tools and supplies are sterilized if you get a tattoo or body piercing, or have acupuncture. · Don't share anything that might have infected blood on it. This may include a toothbrush, razor, or nail clippers. · Use latex condoms during sex if you have HIV.  Also use latex condoms if you have multiple sex partners or a sexually transmitted infection. How is hepatitis C treated? · If you have acute hepatitis C, your doctor will probably prescribe medicine. · If you have chronic hepatitis C, your treatment depends on whether you have liver damage, other health problems you may have, and how much virus is in your body and what type it is. · You will need to see your doctor regularly to have blood tests to check your liver. Follow-up care is a key part of your treatment and safety. Be sure to make and go to all appointments, and call your doctor if you are having problems. It's also a good idea to know your test results and keep a list of the medicines you take. Where can you learn more? Go to [email or web address] org and sign in to your [employer] account. Enter C666 in the [employer] box to learn more about \"Learning About Hepatitis C. \"     If you do not have an account, please click on the \"Sign Up Now\" link. Current as of: July 1, 2021               Content Version: 13.0  © 2006-2021 Healthwise, Incorporated. Care instructions adapted under license by [facility] ([facility]). If you have questions about a medical condition or this instruction, always ask your healthcare professional. [address] any warranty or liability for your use of this information.

## 2021-12-13 ENCOUNTER — APPOINTMENT (OUTPATIENT)
Dept: GENERAL RADIOLOGY | Age: 31
End: 2021-12-13
Payer: MEDICAID

## 2021-12-13 ENCOUNTER — HOSPITAL ENCOUNTER (EMERGENCY)
Age: 31
Discharge: HOME | End: 2021-12-13
Attending: EMERGENCY MEDICINE
Payer: MEDICAID

## 2021-12-13 ENCOUNTER — APPOINTMENT (OUTPATIENT)
Dept: CT IMAGING | Age: 31
End: 2021-12-13
Payer: MEDICAID

## 2021-12-13 VITALS
RESPIRATION RATE: 16 BRPM | HEART RATE: 121 BPM | SYSTOLIC BLOOD PRESSURE: 158 MMHG | OXYGEN SATURATION: 98 % | DIASTOLIC BLOOD PRESSURE: 93 MMHG | TEMPERATURE: 98.6 F

## 2021-12-13 DIAGNOSIS — R10.13 EPIGASTRIC PAIN: Primary | ICD-10-CM

## 2021-12-13 DIAGNOSIS — F19.10 DRUG ABUSE (HCC): ICD-10-CM

## 2021-12-13 DIAGNOSIS — K21.9 GASTROESOPHAGEAL REFLUX DISEASE, UNSPECIFIED WHETHER ESOPHAGITIS PRESENT: ICD-10-CM

## 2021-12-13 LAB
ALBUMIN SERPL-MCNC: 4.6 G/DL (ref 3.5–5.1)
ALP BLD-CCNC: 70 U/L (ref 38–126)
ALT SERPL-CCNC: 68 U/L (ref 11–66)
ANION GAP SERPL CALCULATED.3IONS-SCNC: 14 MEQ/L (ref 8–16)
APTT: 34 SECONDS (ref 22–38)
AST SERPL-CCNC: 34 U/L (ref 5–40)
BASOPHILS # BLD: 0.6 %
BASOPHILS ABSOLUTE: 0 THOU/MM3 (ref 0–0.1)
BILIRUB SERPL-MCNC: 0.7 MG/DL (ref 0.3–1.2)
BILIRUBIN DIRECT: < 0.2 MG/DL (ref 0–0.3)
BUN BLDV-MCNC: 19 MG/DL (ref 7–22)
C-REACTIVE PROTEIN: < 0.3 MG/DL (ref 0–1)
CALCIUM SERPL-MCNC: 9.4 MG/DL (ref 8.5–10.5)
CHLORIDE BLD-SCNC: 104 MEQ/L (ref 98–111)
CO2: 24 MEQ/L (ref 23–33)
CREAT SERPL-MCNC: 0.9 MG/DL (ref 0.4–1.2)
EOSINOPHIL # BLD: 1 %
EOSINOPHILS ABSOLUTE: 0.1 THOU/MM3 (ref 0–0.4)
ERYTHROCYTE [DISTWIDTH] IN BLOOD BY AUTOMATED COUNT: 13.1 % (ref 11.5–14.5)
ERYTHROCYTE [DISTWIDTH] IN BLOOD BY AUTOMATED COUNT: 42.5 FL (ref 35–45)
GFR SERPL CREATININE-BSD FRML MDRD: > 90 ML/MIN/1.73M2
GLUCOSE BLD-MCNC: 125 MG/DL (ref 70–108)
HCT VFR BLD CALC: 46.4 % (ref 42–52)
HEMOGLOBIN: 15.5 GM/DL (ref 14–18)
IMMATURE GRANS (ABS): 0.02 THOU/MM3 (ref 0–0.07)
IMMATURE GRANULOCYTES: 0.3 %
INR BLD: 1.06 (ref 0.85–1.13)
LIPASE: 22.8 U/L (ref 5.6–51.3)
LYMPHOCYTES # BLD: 21.9 %
LYMPHOCYTES ABSOLUTE: 1.7 THOU/MM3 (ref 1–4.8)
MCH RBC QN AUTO: 29.8 PG (ref 26–33)
MCHC RBC AUTO-ENTMCNC: 33.4 GM/DL (ref 32.2–35.5)
MCV RBC AUTO: 89.1 FL (ref 80–94)
MONOCYTES # BLD: 8.2 %
MONOCYTES ABSOLUTE: 0.6 THOU/MM3 (ref 0.4–1.3)
NUCLEATED RED BLOOD CELLS: 0 /100 WBC
OSMOLALITY CALCULATION: 286.9 MOSMOL/KG (ref 275–300)
PLATELET # BLD: 285 THOU/MM3 (ref 130–400)
PMV BLD AUTO: 9.9 FL (ref 9.4–12.4)
POTASSIUM SERPL-SCNC: 4.1 MEQ/L (ref 3.5–5.2)
PROCALCITONIN: 0.08 NG/ML (ref 0.01–0.09)
PROCALCITONIN: 0.09 NG/ML (ref 0.01–0.09)
RBC # BLD: 5.21 MILL/MM3 (ref 4.7–6.1)
SEDIMENTATION RATE, ERYTHROCYTE: 2 MM/HR (ref 0–10)
SEG NEUTROPHILS: 68 %
SEGMENTED NEUTROPHILS ABSOLUTE COUNT: 5.2 THOU/MM3 (ref 1.8–7.7)
SODIUM BLD-SCNC: 142 MEQ/L (ref 135–145)
TOTAL CK: 92 U/L (ref 55–170)
TOTAL CK: 97 U/L (ref 55–170)
TOTAL PROTEIN: 7.7 G/DL (ref 6.1–8)
TSH SERPL DL<=0.05 MIU/L-ACNC: 0.62 UIU/ML (ref 0.4–4.2)
WBC # BLD: 7.7 THOU/MM3 (ref 4.8–10.8)

## 2021-12-13 PROCEDURE — 80053 COMPREHEN METABOLIC PANEL: CPT

## 2021-12-13 PROCEDURE — 82550 ASSAY OF CK (CPK): CPT

## 2021-12-13 PROCEDURE — 71046 X-RAY EXAM CHEST 2 VIEWS: CPT

## 2021-12-13 PROCEDURE — 99281 EMR DPT VST MAYX REQ PHY/QHP: CPT

## 2021-12-13 PROCEDURE — 85025 COMPLETE CBC W/AUTO DIFF WBC: CPT

## 2021-12-13 PROCEDURE — 71275 CT ANGIOGRAPHY CHEST: CPT

## 2021-12-13 PROCEDURE — 96360 HYDRATION IV INFUSION INIT: CPT

## 2021-12-13 PROCEDURE — 36415 COLL VENOUS BLD VENIPUNCTURE: CPT

## 2021-12-13 PROCEDURE — 96361 HYDRATE IV INFUSION ADD-ON: CPT

## 2021-12-13 PROCEDURE — 85610 PROTHROMBIN TIME: CPT

## 2021-12-13 PROCEDURE — 84145 PROCALCITONIN (PCT): CPT

## 2021-12-13 PROCEDURE — 85651 RBC SED RATE NONAUTOMATED: CPT

## 2021-12-13 PROCEDURE — 83690 ASSAY OF LIPASE: CPT

## 2021-12-13 PROCEDURE — 74177 CT ABD & PELVIS W/CONTRAST: CPT

## 2021-12-13 PROCEDURE — 84443 ASSAY THYROID STIM HORMONE: CPT

## 2021-12-13 PROCEDURE — 85730 THROMBOPLASTIN TIME PARTIAL: CPT

## 2021-12-13 PROCEDURE — 82248 BILIRUBIN DIRECT: CPT

## 2021-12-13 PROCEDURE — 87040 BLOOD CULTURE FOR BACTERIA: CPT

## 2021-12-13 PROCEDURE — 6360000004 HC RX CONTRAST MEDICATION: Performed by: EMERGENCY MEDICINE

## 2021-12-13 PROCEDURE — 86140 C-REACTIVE PROTEIN: CPT

## 2021-12-13 PROCEDURE — 2580000003 HC RX 258: Performed by: EMERGENCY MEDICINE

## 2021-12-13 RX ORDER — OMEPRAZOLE 40 MG/1
40 CAPSULE, DELAYED RELEASE ORAL
Qty: 30 CAPSULE | Refills: 0 | Status: SHIPPED | OUTPATIENT
Start: 2021-12-13 | End: 2022-07-26 | Stop reason: SDUPTHER

## 2021-12-13 RX ORDER — SODIUM CHLORIDE 9 MG/ML
INJECTION, SOLUTION INTRAVENOUS CONTINUOUS
Status: DISCONTINUED | OUTPATIENT
Start: 2021-12-13 | End: 2021-12-13 | Stop reason: HOSPADM

## 2021-12-13 RX ORDER — 0.9 % SODIUM CHLORIDE 0.9 %
1000 INTRAVENOUS SOLUTION INTRAVENOUS ONCE
Status: COMPLETED | OUTPATIENT
Start: 2021-12-13 | End: 2021-12-13

## 2021-12-13 RX ADMIN — SODIUM CHLORIDE 1000 ML: 9 INJECTION, SOLUTION INTRAVENOUS at 16:00

## 2021-12-13 RX ADMIN — IOPAMIDOL 80 ML: 755 INJECTION, SOLUTION INTRAVENOUS at 16:29

## 2021-12-13 ASSESSMENT — ENCOUNTER SYMPTOMS
CHOKING: 0
TROUBLE SWALLOWING: 0
ABDOMINAL PAIN: 1
COUGH: 1
EYE PAIN: 0
APNEA: 0
EYE ITCHING: 0
CHEST TIGHTNESS: 0
EYE DISCHARGE: 0

## 2021-12-13 ASSESSMENT — PAIN DESCRIPTION - DESCRIPTORS: DESCRIPTORS: ACHING

## 2021-12-13 ASSESSMENT — PAIN DESCRIPTION - LOCATION: LOCATION: ABDOMEN

## 2021-12-13 ASSESSMENT — PAIN DESCRIPTION - PAIN TYPE: TYPE: ACUTE PAIN

## 2021-12-13 ASSESSMENT — PAIN SCALES - GENERAL: PAINLEVEL_OUTOF10: 3

## 2021-12-13 NOTE — ED PROVIDER NOTES
PATIENT NAME: Louie Berrios  MRN: [id number]  : 1990  LARSON: 2021      I personally saw and examined the patient. I have reviewed and agreed with the resident physician's findings including all diagnostic interpretations and treatment plans as written. Please see resident physician's chart for detailed history of present illness, physical exam and medical decision making. I was present for the key portions of any procedures performed and the inclusive time noted in any critical care statement. MEDICAL DEDISION MAKING (MDM)     50-year-old male with past medical history of drug abuse(he snorted fentanyl, occasionally injected fentanyl also) presents to ED for evaluation of weight loss and spitting \"black sputum\". He states the last quite amount of weight (not sure how much although) over last 4-month. He has \"black sputum\" for years. Exam: tachycardia (at first), lungs CTAB. Abdomen: soft, non-tenderness. Neuro intact    Tachycardia resolved on reassessment. Stable ED stay. ED work-ups are reassuring. Discharged with GI follow-up      FINAL IMPRESSION AND DISPOSITION      1. Epigastric pain    2. Gastroesophageal reflux disease, unspecified whether esophagitis present    3.  Drug abuse [facility])        DISPOSITION Decision To Discharge 2021 06:02:32 PM      PATIENT REFERRED TO:  Jakub Corona MD  [address] [place] [address]. [address]  [phone]    In 1 week  after ED discharge    KATINA Sin - CNP  [address]  [address] [place] [address]. [address]  [phone]    In 1 week  after ED discharge      DISCHARGE MEDICATIONS:  Discharge Medication List as of 2021  6:10 PM      START taking these medications    Details   omeprazole (PRILOSEC) 40 MG delayed release capsule Take 1 capsule by mouth every morning (before breakfast), Disp-30 capsule, R-0Normal             (Please note that portions of this note were completed with a voice recognition program.  Efforts were made to edit the dictations but occasionally words aremis-transcribed.)    MD Javed Valladares MD  12/13/21 1995

## 2021-12-13 NOTE — ED TRIAGE NOTES
Pt presents to the ED with c/o abd pain. Pt states he has had an ongoing issue with abd pain x1 year.  Pt also states he having productive cough with dark sputum

## 2021-12-13 NOTE — ED PROVIDER NOTES
Negative for decreased urine volume, difficulty urinating, dysuria and urgency. Musculoskeletal: Positive for myalgias. Negative for neck pain and neck stiffness. Neurological: Negative for dizziness, tremors, seizures, syncope, facial asymmetry, speech difficulty, weakness, numbness and headaches. Psychiatric/Behavioral: Negative for agitation, hallucinations, self-injury and sleep disturbance. The patient is not nervous/anxious. PAST MEDICAL AND SURGICAL HISTORY     Past Medical History:   Diagnosis Date    Depression      No past surgical history on file. MEDICATIONS     Current Facility-Administered Medications:     0.9 % sodium chloride infusion, , IntraVENous, Continuous, Mukesh Burk MD    Current Outpatient Medications:     omeprazole (PRILOSEC) 40 MG delayed release capsule, Take 1 capsule by mouth every morning (before breakfast), Disp: 30 capsule, Rfl: 0    gabapentin (NEURONTIN) 100 MG capsule, Take 1 tab PO qhs x 1 week, then take 1 tab PO BID x 1 week, then take 1 tab PO TID., Disp: 63 capsule, Rfl: 0      SOCIAL HISTORY     Social History     Social History Narrative    Not on file     Social History     Tobacco Use    Smoking status: Current Every Day Smoker     Packs/day: 0.50     Years: 5.00     Pack years: 2.50     Types: Cigarettes    Smokeless tobacco: Never Used   Substance Use Topics    Alcohol use: No    Drug use: Yes     Types: Marijuana (Weed)     Comment: hx heroin and fentanyl in the past, clean now         ALLERGIES   No Known Allergies      FAMILY HISTORY   No family history on file. PREVIOUS RECORDS   Previous records reviewed:   PHYSICAL EXAM     ED Triage Vitals [12/13/21 1448]   BP Temp Temp Source Pulse Resp SpO2 Height Weight   (!) 158/93 98.6 °F (37 °C) Oral 121 16 98 % -- --     Initial vital signs and nursing assessment reviewed and abnormal from tachycardia, hypertension. There is no height or weight on file to calculate BMI.  Pulsoximetry is normal per my interpretation. Additional Vital Signs:  Vitals:    12/13/21 1448   BP: (!) 158/93   Pulse: 121   Resp: 16   Temp: 98.6 °F (37 °C)   SpO2: 98%       Physical Exam  Vitals reviewed. Constitutional:       General: He is not in acute distress. Appearance: He is not ill-appearing, toxic-appearing or diaphoretic. HENT:      Head: Atraumatic. Mouth/Throat:      Pharynx: No pharyngeal swelling. Eyes:      General: No scleral icterus. Cardiovascular:      Rate and Rhythm: Regular rhythm. Tachycardia present. Heart sounds: No murmur heard. No gallop. Pulmonary:      Breath sounds: No stridor. No wheezing, rhonchi or rales. Chest:      Chest wall: No tenderness. Abdominal:      General: There is no distension. There are no signs of injury. Palpations: There is no shifting dullness, fluid wave, mass or pulsatile mass. Tenderness: There is no abdominal tenderness. There is no right CVA tenderness, left CVA tenderness or rebound. Negative signs include Marinelli's sign, Rovsing's sign, McBurney's sign and psoas sign. Hernia: No hernia is present. Skin:     Capillary Refill: Capillary refill takes less than 2 seconds. Coloration: Skin is not cyanotic or jaundiced. Neurological:      General: No focal deficit present. Mental Status: He is alert and oriented to person, place, and time. Cranial Nerves: No cranial nerve deficit. Psychiatric:         Mood and Affect: Mood is anxious. Mood is not depressed. MEDICAL DECISION MAKING   Initial Assessment:   1. Abdominal pain  2. GERD. 3. Drug abuse -fentanyl  Plan:    CBC, BMP, hepatic panel, CRP, ESR, TSH, bnlood cultures 1/2, INR   CXR, ECG, CT chest/abdomen.    IV NS bolus 1 L   GI / PCP follow up        ED RESULTS   Laboratory results:  Labs Reviewed   BASIC METABOLIC PANEL - Abnormal; Notable for the following components:       Result Value    Glucose 125 (*)     All other components within normal limits   HEPATIC FUNCTION PANEL - Abnormal; Notable for the following components:    ALT 68 (*)     All other components within normal limits   CULTURE, BLOOD 1   CULTURE, BLOOD 2   CBC WITH AUTO DIFFERENTIAL   LIPASE   C-REACTIVE PROTEIN   SEDIMENTATION RATE   CK   PROCALCITONIN   PROCALCITONIN   CK   TSH WITH REFLEX   APTT   PROTIME-INR   ANION GAP   OSMOLALITY   GLOMERULAR FILTRATION RATE, ESTIMATED   URINE RT REFLEX TO CULTURE   URINE DRUG SCREEN       Radiologic studies results:  CTA CHEST W WO CONTRAST   Final Result    No pulmonary emboli or pulmonary infiltrates. **This report has been created using voice recognition software. It may contain minor errors which are inherent in voice recognition technology. **      Final report electronically signed by Dr. Catracho Hassan on 12/13/2021 4:40 PM      CT ABDOMEN PELVIS W IV CONTRAST Additional Contrast? None   Final Result   1. No acute bowel obstruction or acute inflammatory bowel process   2. A large amount of stool fills the colon. **This report has been created using voice recognition software. It may contain minor errors which are inherent in voice recognition technology. **      Final report electronically signed by Dr Britt Gamez on 12/13/2021 4:48 PM      XR CHEST (2 VW)   Final Result   1. The cardiac silhouette is deviated towards the right side. 2. Otherwise no acute cardiopulmonary disease. .               **This report has been created using voice recognition software. It may contain minor errors which are inherent in voice recognition technology. **      Final report electronically signed by DR Chuy Pritchett on 12/13/2021 3:55 PM          ED Medications administered this visit:   Medications   0.9 % sodium chloride infusion (has no administration in time range)   0.9 % sodium chloride bolus (0 mLs IntraVENous Stopped 12/13/21 1825)   iopamidol (ISOVUE-370) 76 % injection 80 mL (80 mLs IntraVENous Given 12/13/21 1629) ED COURSE      CXr- negative for acute pathology  CT chest/abd/pelvis -negative for acute pathology  Lab workup revealed procal/TSH/INR/CK/lipase within normal limits. Patient reports much improvement after NS bolus. Cannot do urine sample for lab and refusing it for now. Patient is stable, being discharged home. Impressions, lab results and further recommendations given. He verbalizes understanding of all. He will need to take omeprazole, follow up with Gastroenterology within 1 week. PCP follow up after ED discharge. Smoking/ilicit drug quitting. Healthy diet. Strict return precautions and follow up instructions were discussed with the patient prior to discharge, with which the patient agrees. MEDICATION CHANGES     Discharge Medication List as of 12/13/2021  6:10 PM      START taking these medications    Details   omeprazole (PRILOSEC) 40 MG delayed release capsule Take 1 capsule by mouth every morning (before breakfast), Disp-30 capsule, R-0Normal               FINAL DISPOSITION     Final diagnoses:   Epigastric pain   Gastroesophageal reflux disease, unspecified whether esophagitis present   Drug abuse ([facility] [address]     Condition: condition: stable  Dispo: Discharge to home      This transcription was electronically signed. Parts of this transcriptions may have been dictated by use of voice recognition software and electronically transcribed, and parts may have been transcribed with the assistance of an ED scribe. The transcription may contain errors not detected in proofreading. Please refer to my supervising physician's documentation if my documentation differs.     Electronically Signed: Roland Landers DO, 12/13/21, 6:39 PM       Roland Kim DO  Resident  12/13/21 0909

## 2021-12-19 LAB
BLOOD CULTURE, ROUTINE: NORMAL
BLOOD CULTURE, ROUTINE: NORMAL

## 2022-03-01 ENCOUNTER — TELEPHONE (OUTPATIENT)
Dept: FAMILY MEDICINE CLINIC | Age: 32
End: 2022-03-01

## 2022-03-01 NOTE — TELEPHONE ENCOUNTER
Pt sciatic nerve pain acting up  Off work today, letter given  Electronically signed by KATINA Camacho CNP on 3/1/2022 at 4:30 PM

## 2022-07-26 ENCOUNTER — OFFICE VISIT (OUTPATIENT)
Dept: FAMILY MEDICINE CLINIC | Age: 32
End: 2022-07-26
Payer: MEDICAID

## 2022-07-26 ENCOUNTER — TELEPHONE (OUTPATIENT)
Dept: FAMILY MEDICINE CLINIC | Age: 32
End: 2022-07-26

## 2022-07-26 VITALS
OXYGEN SATURATION: 99 % | TEMPERATURE: 98 F | BODY MASS INDEX: 19.53 KG/M2 | DIASTOLIC BLOOD PRESSURE: 80 MMHG | HEART RATE: 98 BPM | SYSTOLIC BLOOD PRESSURE: 100 MMHG | WEIGHT: 110.2 LBS | HEIGHT: 63 IN | RESPIRATION RATE: 16 BRPM

## 2022-07-26 DIAGNOSIS — B19.20 HEPATITIS C TEST POSITIVE: ICD-10-CM

## 2022-07-26 DIAGNOSIS — R63.4 WEIGHT LOSS: Primary | ICD-10-CM

## 2022-07-26 DIAGNOSIS — K21.9 GASTROESOPHAGEAL REFLUX DISEASE, UNSPECIFIED WHETHER ESOPHAGITIS PRESENT: ICD-10-CM

## 2022-07-26 DIAGNOSIS — R00.2 HEART PALPITATIONS: ICD-10-CM

## 2022-07-26 DIAGNOSIS — R10.13 EPIGASTRIC PAIN: ICD-10-CM

## 2022-07-26 DIAGNOSIS — M25.50 ARTHRALGIA, UNSPECIFIED JOINT: ICD-10-CM

## 2022-07-26 PROCEDURE — G8427 DOCREV CUR MEDS BY ELIG CLIN: HCPCS | Performed by: NURSE PRACTITIONER

## 2022-07-26 PROCEDURE — 4004F PT TOBACCO SCREEN RCVD TLK: CPT | Performed by: NURSE PRACTITIONER

## 2022-07-26 PROCEDURE — 93000 ELECTROCARDIOGRAM COMPLETE: CPT | Performed by: NURSE PRACTITIONER

## 2022-07-26 PROCEDURE — 99214 OFFICE O/P EST MOD 30 MIN: CPT | Performed by: NURSE PRACTITIONER

## 2022-07-26 PROCEDURE — G8420 CALC BMI NORM PARAMETERS: HCPCS | Performed by: NURSE PRACTITIONER

## 2022-07-26 RX ORDER — OMEPRAZOLE 40 MG/1
40 CAPSULE, DELAYED RELEASE ORAL
Qty: 30 CAPSULE | Refills: 0 | Status: SHIPPED | OUTPATIENT
Start: 2022-07-26 | End: 2022-09-21 | Stop reason: SDUPTHER

## 2022-07-26 RX ORDER — BUPRENORPHINE AND NALOXONE 8; 2 MG/1; MG/1
1 FILM, SOLUBLE BUCCAL; SUBLINGUAL DAILY
COMMUNITY

## 2022-07-26 RX ORDER — GABAPENTIN 100 MG/1
CAPSULE ORAL
Qty: 63 CAPSULE | Refills: 0 | Status: SHIPPED | OUTPATIENT
Start: 2022-07-26 | End: 2022-08-17 | Stop reason: SDUPTHER

## 2022-07-26 RX ORDER — SUCRALFATE 1 G/1
1 TABLET ORAL 4 TIMES DAILY
Qty: 56 TABLET | Refills: 0 | Status: SHIPPED | OUTPATIENT
Start: 2022-07-26 | End: 2022-08-09

## 2022-07-26 ASSESSMENT — PATIENT HEALTH QUESTIONNAIRE - PHQ9
SUM OF ALL RESPONSES TO PHQ QUESTIONS 1-9: 2
SUM OF ALL RESPONSES TO PHQ9 QUESTIONS 1 & 2: 2
1. LITTLE INTEREST OR PLEASURE IN DOING THINGS: 1
SUM OF ALL RESPONSES TO PHQ QUESTIONS 1-9: 2
2. FEELING DOWN, DEPRESSED OR HOPELESS: 1

## 2022-07-26 NOTE — PROGRESS NOTES
Main Alfonso is a 34 y.o. male thatpresents for Pain and Weight Loss      History obtained today from Patient. Follow up for Hep C  Notes occasional N, V  Bowels are regular  Occasional bloody stools  Reports appetite is his normal, feels like he eats often and normal amounts, but has been losing weight unintentionally  Referral placed to Dr. Yani Desai last year, never went in, been clean 200+ days, wants to get further evaluated    Says he had labs today, unsure where, somewhere on [address]    Reports generalized pain, all over  Reports hx fibromyalgia  On Suboxone per Ricky Ramirez  Was on Gabapentin in the past, says this helped a lot with his pain    Palpitations  Symptoms have been present for  several  month(s). Symptoms are unchanged since they initially started. he describes palpitations as a rapid heartbeat  Palpitations occur  at least 1 times per day     Associated symptoms - palpitations    Palpitations occur at rest, with exercise, with anxiety, during the night, and randomly  Caffeine use? Quit drinking all caffeine, mostly drinking water now  Alcohol use? No  History of anxiety? Yes - says palpitations make his anxiety worse because he worries about his heart but also thinks his anxiety can contribute at times  Do symptoms occur with exertion? Yes  Do symptoms wake patient from sleep? Yes  Family history of heart conditions? No    EKG - Rhythm - SR, Rate - 67, no acute ST segment changes. Stable from previous EKG in 2019. I have reviewed the patient's past medical history, past surgical history, allergies,medications, social and family history and I have made updates where appropriate.     Past Medical History:   Diagnosis Date    Depression     Hepatitis C        Social History     Tobacco Use    Smoking status: Every Day     Packs/day: 0.50     Years: 5.00     Pack years: 2.50     Types: Cigarettes    Smokeless tobacco: Never   Substance Use Topics    Alcohol use: No    Drug use: Yes     Types: Marijuana [place])     Comment: hx heroin and fentanyl in the past, clean now       No family history on file. Review of Systems        PHYSICAL EXAM:  /80   Pulse 98   Temp 98 °F (36.7 °C) (Infrared)   Resp 16   Ht 5' 3\" (1.6 m)   Wt 110 lb 3.2 oz (50 kg)   SpO2 99%   BMI 19.52 kg/m²     Physical Exam  Constitutional:       Appearance: Normal appearance. HENT:      Head: Normocephalic and atraumatic. Right Ear: External ear normal.      Left Ear: External ear normal.      Nose: Nose normal.      Mouth/Throat:      Mouth: Mucous membranes are moist.   Eyes:      Conjunctiva/sclera: Conjunctivae normal.   Cardiovascular:      Rate and Rhythm: Normal rate and regular rhythm. Pulses: Normal pulses. Heart sounds: Normal heart sounds. Pulmonary:      Effort: Pulmonary effort is normal.      Breath sounds: Normal breath sounds. Abdominal:      General: Bowel sounds are normal.      Palpations: Abdomen is soft. Tenderness: There is abdominal tenderness. There is no right CVA tenderness or left CVA tenderness. Comments: Abd tenderness in RUQ and LUQ, epigastric tenderness as well   Musculoskeletal:         General: Normal range of motion. Cervical back: Normal range of motion. Skin:     General: Skin is warm and dry. Neurological:      General: No focal deficit present. Mental Status: He is alert and oriented to person, place, and time. Psychiatric:         Mood and Affect: Mood normal.         Behavior: Behavior normal.         ASSESSMENT & PLAN  [place] was seen today for pain and weight loss. Diagnoses and all orders for this visit:    Weight loss  -     SIRISHA Medina MD, Gastroenterology, [facility]    Hepatitis C test positive  -     SIRISHA Medina MD, Gastroenterology, [place]    Arthralgia, unspecified joint  -     gabapentin (NEURONTIN) 100 MG capsule;  Take 1 tab PO qhs x 1 week, then take 1 tab PO BID x 1 week, then take 1 tab PO TID. Epigastric pain  -     sucralfate (CARAFATE) 1 GM tablet; Take 1 tablet by mouth in the morning and 1 tablet at noon and 1 tablet in the evening and 1 tablet before bedtime. Do all this for 14 days.  -     EKG 12 lead    Heart palpitations  -     EKG 12 lead  -     Holter Monitor 48 Hour; Future    Gastroesophageal reflux disease, unspecified whether esophagitis present  -     omeprazole (PRILOSEC) 40 MG delayed release capsule; Take 1 capsule by mouth every morning (before breakfast)      Will [facility] results of [facility] in about 4 weeks (around 8/23/2022). Referred to Dr. Pratt Belpre    All copied or forwarded information in the progress note was verified by me to be accurate at the time of visit  Patient's past medical, surgical, social and family history were reviewed and updated     All patient questions answered. Patient voiced understanding.

## 2022-07-26 NOTE — TELEPHONE ENCOUNTER
Please see if we can get results of labs he got done yesterday  He thinks it was a [employer] on [address]???

## 2022-08-03 ENCOUNTER — TELEPHONE (OUTPATIENT)
Dept: FAMILY MEDICINE CLINIC | Age: 32
End: 2022-08-03

## 2022-08-03 NOTE — TELEPHONE ENCOUNTER
----- Message from KATINA Hickman CNP sent at 7/26/2022  1:55 PM EDT -----  Regarding: omeprazole and carafate  Check on pt abd pain and gerd. Is he doing better since starting the meds?   Has he seen GI?

## 2022-08-03 NOTE — TELEPHONE ENCOUNTER
I spoke to patient and he is doing well with the new meds, states he had an EGD done on Monday with Dr. Tom Shannon.

## 2022-08-05 ENCOUNTER — HOSPITAL ENCOUNTER (OUTPATIENT)
Dept: NON INVASIVE DIAGNOSTICS | Age: 32
Discharge: HOME | End: 2022-08-05
Payer: MEDICAID

## 2022-08-05 DIAGNOSIS — R00.2 HEART PALPITATIONS: ICD-10-CM

## 2022-08-05 PROCEDURE — 93225 XTRNL ECG REC<48 HRS REC: CPT

## 2022-08-05 PROCEDURE — 93226 XTRNL ECG REC<48 HR SCAN A/R: CPT

## 2022-08-15 LAB
ACQUISITION DURATION: NORMAL S
AVERAGE HEART RATE: 83 BPM
HOOKUP DATE: NORMAL
HOOKUP TIME: NORMAL
MAX HEART RATE TIME/DATE: NORMAL
MAX HEART RATE: 141 BPM
MIN HEART RATE TIME/DATE: NORMAL
MIN HEART RATE: 40 BPM
NUMBER OF QRS COMPLEXES: NORMAL
NUMBER OF SUPRAVENTRICULAR COUPLETS: 0
NUMBER OF SUPRAVENTRICULAR ECTOPICS: 2
NUMBER OF SUPRAVENTRICULAR ISOLATED BEATS: 2
NUMBER OF VENTRICULAR BIGEMINAL CYCLES: 0
NUMBER OF VENTRICULAR COUPLETS: 0
NUMBER OF VENTRICULAR ECTOPICS: 0

## 2022-08-16 ENCOUNTER — TELEPHONE (OUTPATIENT)
Dept: FAMILY MEDICINE CLINIC | Age: 32
End: 2022-08-16

## 2022-08-16 NOTE — TELEPHONE ENCOUNTER
----- Message from KATINA Winter CNP sent at 8/16/2022  3:13 PM EDT -----  Holter Monitor was negative for any heart arrythmia. He was supposed to follow up with me 4 weeks from his last visit. Looks like he was accidentally scheduled for Aug 2023. Can we change his appt to sometime in the next couple weeks?

## 2022-08-16 NOTE — TELEPHONE ENCOUNTER
I called pt and advised him of note and he verbalized understanding and rescheduled him to Aug 30, @ 8:00pm. Pt wanted to know if you could refill his gabapentin? Pt had no other questions at this time.

## 2022-08-17 ENCOUNTER — TELEPHONE (OUTPATIENT)
Dept: FAMILY MEDICINE CLINIC | Age: 32
End: 2022-08-17

## 2022-08-17 DIAGNOSIS — M25.50 ARTHRALGIA, UNSPECIFIED JOINT: ICD-10-CM

## 2022-08-17 RX ORDER — GABAPENTIN 100 MG/1
100 CAPSULE ORAL 3 TIMES DAILY
Qty: 90 CAPSULE | Refills: 0 | Status: SHIPPED | OUTPATIENT
Start: 2022-08-24 | End: 2022-09-21

## 2022-09-21 DIAGNOSIS — M25.50 ARTHRALGIA, UNSPECIFIED JOINT: ICD-10-CM

## 2022-09-21 DIAGNOSIS — K21.9 GASTROESOPHAGEAL REFLUX DISEASE, UNSPECIFIED WHETHER ESOPHAGITIS PRESENT: ICD-10-CM

## 2022-09-21 RX ORDER — GABAPENTIN 100 MG/1
CAPSULE ORAL
Qty: 90 CAPSULE | Refills: 0 | Status: SHIPPED | OUTPATIENT
Start: 2022-09-21 | End: 2022-10-21 | Stop reason: SDUPTHER

## 2022-09-21 RX ORDER — OMEPRAZOLE 40 MG/1
40 CAPSULE, DELAYED RELEASE ORAL
Qty: 30 CAPSULE | Refills: 0 | Status: SHIPPED | OUTPATIENT
Start: 2022-09-21 | End: 2022-11-02

## 2022-09-21 NOTE — TELEPHONE ENCOUNTER
----- Message from [address] sent at 9/21/2022  9:48 AM EDT -----  Subject: Refill Request    QUESTIONS  Name of Medication? omeprazole (PRILOSEC) 40 MG delayed release capsule  Patient-reported dosage and instructions? once a day  How many days do you have left? 2  Preferred Pharmacy? [address] phone number (if available)? [phone]  ---------------------------------------------------------------------------  --------------,  Name of Medication? gabapentin (NEURONTIN) 100 MG capsule  Patient-reported dosage and instructions? 3x a day  How many days do you have left? 1  Preferred Pharmacy? [address] phone number (if available)? [phone]  ---------------------------------------------------------------------------  --------------  CALL BACK INFO  What is the best way for the office to contact you? OK to leave message on   voicemail  Preferred Call Back Phone Number? [phone]  ---------------------------------------------------------------------------  --------------  SCRIPT ANSWERS  Relationship to Patient?  Self

## 2022-09-21 NOTE — TELEPHONE ENCOUNTER
Recent Visits  Date Type Provider Dept   07/26/22 Office Visit Vanesa Patel, APRN - CNP [facility]  [facility] [address]   10/20/21 Office Visit Vanesa Patel, KATINA - CNP [facility]  [address] recent visits within past 540 days with a meds authorizing provider and meeting all other requirements  Future Appointments  No visits were found meeting these conditions. Showing future appointments within next 150 days with a meds authorizing provider and meeting all other requirements=    No future appointments.

## 2022-09-22 RX ORDER — GABAPENTIN 100 MG/1
100 CAPSULE ORAL 3 TIMES DAILY
Qty: 90 CAPSULE | Refills: 0 | OUTPATIENT
Start: 2022-09-22 | End: 2022-10-22

## 2022-10-21 DIAGNOSIS — M25.50 ARTHRALGIA, UNSPECIFIED JOINT: ICD-10-CM

## 2022-10-21 RX ORDER — GABAPENTIN 100 MG/1
CAPSULE ORAL
Qty: 90 CAPSULE | Refills: 3 | Status: SHIPPED | OUTPATIENT
Start: 2022-10-21 | End: 2023-02-18

## 2022-10-21 NOTE — TELEPHONE ENCOUNTER
Patient requesting to go to [facility] [address]  Please approve or refuse Rx request:  Requested Prescriptions     Pending Prescriptions Disp Refills    gabapentin (NEURONTIN) 100 MG capsule 90 capsule 0       Next appointment:  Visit date not found

## 2022-11-02 DIAGNOSIS — K21.9 GASTROESOPHAGEAL REFLUX DISEASE, UNSPECIFIED WHETHER ESOPHAGITIS PRESENT: ICD-10-CM

## 2022-11-02 RX ORDER — OMEPRAZOLE 40 MG/1
CAPSULE, DELAYED RELEASE ORAL
Qty: 30 CAPSULE | Refills: 0 | Status: SHIPPED | OUTPATIENT
Start: 2022-11-02

## 2022-12-05 ENCOUNTER — HOSPITAL ENCOUNTER (EMERGENCY)
Age: 32
Discharge: HOME | End: 2022-12-05
Attending: EMERGENCY MEDICINE
Payer: MEDICAID

## 2022-12-05 ENCOUNTER — APPOINTMENT (OUTPATIENT)
Dept: CT IMAGING | Age: 32
End: 2022-12-05
Payer: MEDICAID

## 2022-12-05 ENCOUNTER — APPOINTMENT (OUTPATIENT)
Dept: GENERAL RADIOLOGY | Age: 32
End: 2022-12-05
Payer: MEDICAID

## 2022-12-05 VITALS
HEIGHT: 63 IN | TEMPERATURE: 100.1 F | OXYGEN SATURATION: 97 % | SYSTOLIC BLOOD PRESSURE: 112 MMHG | HEART RATE: 63 BPM | RESPIRATION RATE: 13 BRPM | BODY MASS INDEX: 19.14 KG/M2 | DIASTOLIC BLOOD PRESSURE: 67 MMHG | WEIGHT: 108 LBS

## 2022-12-05 DIAGNOSIS — R10.13 ABDOMINAL PAIN, EPIGASTRIC: ICD-10-CM

## 2022-12-05 DIAGNOSIS — K21.9 GASTROESOPHAGEAL REFLUX DISEASE, UNSPECIFIED WHETHER ESOPHAGITIS PRESENT: ICD-10-CM

## 2022-12-05 DIAGNOSIS — Z86.19 HISTORY OF HEPATITIS C: ICD-10-CM

## 2022-12-05 DIAGNOSIS — K29.00 ACUTE GASTRITIS WITHOUT HEMORRHAGE, UNSPECIFIED GASTRITIS TYPE: Primary | ICD-10-CM

## 2022-12-05 LAB
ALBUMIN SERPL-MCNC: 3.9 G/DL (ref 3.5–5.1)
ALP BLD-CCNC: 78 U/L (ref 38–126)
ALT SERPL-CCNC: 10 U/L (ref 11–66)
ANION GAP SERPL CALCULATED.3IONS-SCNC: 17 MEQ/L (ref 8–16)
AST SERPL-CCNC: 14 U/L (ref 5–40)
BASOPHILS # BLD: 0.4 %
BASOPHILS ABSOLUTE: 0.1 THOU/MM3 (ref 0–0.1)
BILIRUB SERPL-MCNC: 0.5 MG/DL (ref 0.3–1.2)
BILIRUBIN DIRECT: < 0.2 MG/DL (ref 0–0.3)
BUN BLDV-MCNC: 20 MG/DL (ref 7–22)
CALCIUM SERPL-MCNC: 9.3 MG/DL (ref 8.5–10.5)
CHLORIDE BLD-SCNC: 96 MEQ/L (ref 98–111)
CO2: 24 MEQ/L (ref 23–33)
CREAT SERPL-MCNC: 0.7 MG/DL (ref 0.4–1.2)
EOSINOPHIL # BLD: 0.1 %
EOSINOPHILS ABSOLUTE: 0 THOU/MM3 (ref 0–0.4)
ERYTHROCYTE [DISTWIDTH] IN BLOOD BY AUTOMATED COUNT: 12.7 % (ref 11.5–14.5)
ERYTHROCYTE [DISTWIDTH] IN BLOOD BY AUTOMATED COUNT: 39.8 FL (ref 35–45)
GFR SERPL CREATININE-BSD FRML MDRD: > 60 ML/MIN/1.73M2
GLUCOSE BLD-MCNC: 122 MG/DL (ref 70–108)
HCT VFR BLD CALC: 41 % (ref 42–52)
HEMOGLOBIN: 14.3 GM/DL (ref 14–18)
IMMATURE GRANS (ABS): 0.05 THOU/MM3 (ref 0–0.07)
IMMATURE GRANULOCYTES: 0.4 %
INFLUENZA A: NOT DETECTED
INFLUENZA B: NOT DETECTED
LACTIC ACID: 1 MMOL/L (ref 0.5–2)
LYMPHOCYTES # BLD: 21.7 %
LYMPHOCYTES ABSOLUTE: 3 THOU/MM3 (ref 1–4.8)
MCH RBC QN AUTO: 30 PG (ref 26–33)
MCHC RBC AUTO-ENTMCNC: 34.9 GM/DL (ref 32.2–35.5)
MCV RBC AUTO: 86 FL (ref 80–94)
MONOCYTES # BLD: 6.5 %
MONOCYTES ABSOLUTE: 0.9 THOU/MM3 (ref 0.4–1.3)
NUCLEATED RED BLOOD CELLS: 0 /100 WBC
OSMOLALITY CALCULATION: 277.7 MOSMOL/KG (ref 275–300)
PLATELET # BLD: 266 THOU/MM3 (ref 130–400)
PMV BLD AUTO: 10.6 FL (ref 9.4–12.4)
POTASSIUM SERPL-SCNC: 3.3 MEQ/L (ref 3.5–5.2)
PROCALCITONIN: 0.07 NG/ML (ref 0.01–0.09)
RBC # BLD: 4.77 MILL/MM3 (ref 4.7–6.1)
SARS-COV-2 RNA, RT PCR: NOT DETECTED
SEG NEUTROPHILS: 70.9 %
SEGMENTED NEUTROPHILS ABSOLUTE COUNT: 9.9 THOU/MM3 (ref 1.8–7.7)
SODIUM BLD-SCNC: 137 MEQ/L (ref 135–145)
TOTAL PROTEIN: 7.5 G/DL (ref 6.1–8)
WBC # BLD: 14 THOU/MM3 (ref 4.8–10.8)

## 2022-12-05 PROCEDURE — 6360000004 HC RX CONTRAST MEDICATION: Performed by: EMERGENCY MEDICINE

## 2022-12-05 PROCEDURE — 83605 ASSAY OF LACTIC ACID: CPT

## 2022-12-05 PROCEDURE — 87636 SARSCOV2 & INF A&B AMP PRB: CPT

## 2022-12-05 PROCEDURE — C9113 INJ PANTOPRAZOLE SODIUM, VIA: HCPCS | Performed by: EMERGENCY MEDICINE

## 2022-12-05 PROCEDURE — 96365 THER/PROPH/DIAG IV INF INIT: CPT

## 2022-12-05 PROCEDURE — 6360000002 HC RX W HCPCS: Performed by: EMERGENCY MEDICINE

## 2022-12-05 PROCEDURE — 2580000003 HC RX 258: Performed by: EMERGENCY MEDICINE

## 2022-12-05 PROCEDURE — 99285 EMERGENCY DEPT VISIT HI MDM: CPT

## 2022-12-05 PROCEDURE — 74177 CT ABD & PELVIS W/CONTRAST: CPT

## 2022-12-05 PROCEDURE — 71046 X-RAY EXAM CHEST 2 VIEWS: CPT

## 2022-12-05 PROCEDURE — 36415 COLL VENOUS BLD VENIPUNCTURE: CPT

## 2022-12-05 PROCEDURE — 82248 BILIRUBIN DIRECT: CPT

## 2022-12-05 PROCEDURE — 96375 TX/PRO/DX INJ NEW DRUG ADDON: CPT

## 2022-12-05 PROCEDURE — 84145 PROCALCITONIN (PCT): CPT

## 2022-12-05 PROCEDURE — 80053 COMPREHEN METABOLIC PANEL: CPT

## 2022-12-05 PROCEDURE — 96366 THER/PROPH/DIAG IV INF ADDON: CPT

## 2022-12-05 PROCEDURE — 85025 COMPLETE CBC W/AUTO DIFF WBC: CPT

## 2022-12-05 RX ORDER — 0.9 % SODIUM CHLORIDE 0.9 %
500 INTRAVENOUS SOLUTION INTRAVENOUS ONCE
Status: COMPLETED | OUTPATIENT
Start: 2022-12-05 | End: 2022-12-05

## 2022-12-05 RX ORDER — PANTOPRAZOLE SODIUM 40 MG/10ML
40 INJECTION, POWDER, LYOPHILIZED, FOR SOLUTION INTRAVENOUS ONCE
Status: COMPLETED | OUTPATIENT
Start: 2022-12-05 | End: 2022-12-05

## 2022-12-05 RX ORDER — PANTOPRAZOLE SODIUM 40 MG/1
40 TABLET, DELAYED RELEASE ORAL DAILY
Qty: 90 TABLET | Refills: 0 | Status: SHIPPED | OUTPATIENT
Start: 2022-12-05

## 2022-12-05 RX ORDER — SODIUM CHLORIDE 9 MG/ML
INJECTION, SOLUTION INTRAVENOUS CONTINUOUS
Status: DISCONTINUED | OUTPATIENT
Start: 2022-12-05 | End: 2022-12-05 | Stop reason: HOSPADM

## 2022-12-05 RX ORDER — ONDANSETRON 4 MG/1
TABLET, FILM COATED ORAL
Qty: 15 TABLET | Refills: 0 | Status: SHIPPED | OUTPATIENT
Start: 2022-12-05

## 2022-12-05 RX ORDER — ONDANSETRON 2 MG/ML
4 INJECTION INTRAMUSCULAR; INTRAVENOUS ONCE
Status: COMPLETED | OUTPATIENT
Start: 2022-12-05 | End: 2022-12-05

## 2022-12-05 RX ORDER — POTASSIUM CHLORIDE 7.45 MG/ML
10 INJECTION INTRAVENOUS ONCE
Status: COMPLETED | OUTPATIENT
Start: 2022-12-05 | End: 2022-12-05

## 2022-12-05 RX ADMIN — ONDANSETRON 4 MG: 2 INJECTION INTRAMUSCULAR; INTRAVENOUS at 16:06

## 2022-12-05 RX ADMIN — SODIUM CHLORIDE 500 ML: 9 INJECTION, SOLUTION INTRAVENOUS at 16:06

## 2022-12-05 RX ADMIN — SODIUM CHLORIDE: 9 INJECTION, SOLUTION INTRAVENOUS at 17:12

## 2022-12-05 RX ADMIN — POTASSIUM CHLORIDE 10 MEQ: 7.46 INJECTION, SOLUTION INTRAVENOUS at 19:05

## 2022-12-05 RX ADMIN — PANTOPRAZOLE SODIUM 40 MG: 40 INJECTION, POWDER, FOR SOLUTION INTRAVENOUS at 16:08

## 2022-12-05 RX ADMIN — IOPAMIDOL 80 ML: 755 INJECTION, SOLUTION INTRAVENOUS at 18:20

## 2022-12-05 RX ADMIN — SODIUM CHLORIDE: 9 INJECTION, SOLUTION INTRAVENOUS at 19:08

## 2022-12-05 ASSESSMENT — ENCOUNTER SYMPTOMS
WHEEZING: 0
CHEST TIGHTNESS: 0
SHORTNESS OF BREATH: 0
VOMITING: 1
NAUSEA: 1
SORE THROAT: 0
SINUS PRESSURE: 0
CONSTIPATION: 0
BACK PAIN: 0
DIARRHEA: 1
VOICE CHANGE: 0
COUGH: 1
ABDOMINAL PAIN: 1
TROUBLE SWALLOWING: 0
RHINORRHEA: 0

## 2022-12-05 ASSESSMENT — PAIN - FUNCTIONAL ASSESSMENT
PAIN_FUNCTIONAL_ASSESSMENT: NONE - DENIES PAIN

## 2022-12-05 NOTE — ED PROVIDER NOTES
[facility]        Room # 32/12A    CHIEF COMPLAINT    Chief Complaint   Patient presents with    Abdominal Pain    Other     Coughing up blood       Nurses Notes reviewed and I agree except as noted in the HPI. HPI    Roman Aldrich is a 34 y.o. male who presents for evaluation of cough and colds for 2 days associated with nausea and vomiting 5 x  approximately 1 bowl of vomitus already. He also experience loose bowel movement but had no arpita blood or black stools. He said he noticed some blood on his cough and this got him worried and went to [facility]. He was discharged but he noticed blood when coughing and prompted him to go to the ED. He does admit that he has heartburns and takes omeprazole ,denies any dysuria frequency hematuria no melena or hematochezia. REVIEW OF SYSTEMS    Review of Systems   Constitutional:  Positive for fever. Negative for appetite change, chills, diaphoresis and fatigue. HENT:  Negative for congestion, ear discharge, ear pain, postnasal drip, rhinorrhea, sinus pressure, sore throat, trouble swallowing and voice change. Respiratory:  Positive for cough. Negative for chest tightness, shortness of breath and wheezing. Cardiovascular:  Negative for chest pain, palpitations and leg swelling. Gastrointestinal:  Positive for abdominal pain, diarrhea, nausea and vomiting. Negative for constipation. Musculoskeletal:  Negative for arthralgias, back pain, joint swelling, myalgias, neck pain and neck stiffness. Skin:  Negative for rash. Neurological:  Negative for dizziness, syncope, weakness, light-headedness, numbness and headaches. PAST MEDICAL HISTORY     has a past medical history of Depression and Hepatitis C. GERD on omeprazole. SURGICAL HISTORY   has no past surgical history on file.     CURRENT MEDICATIONS    Previous Medications    BUPRENORPHINE-NALOXONE (SUBOXONE) 8-2 MG FILM SL FILM    Place 1 Film under the tongue in the morning. GABAPENTIN (NEURONTIN) 100 MG CAPSULE    TAKE 1 CAPSULE BY MOUTH THREE TIMES DAILY    SUCRALFATE (CARAFATE) 1 GM TABLET    Take 1 tablet by mouth in the morning and 1 tablet at noon and 1 tablet in the evening and 1 tablet before bedtime. Do all this for 14 days. ALLERGIES    has No Known Allergies. FAMILY HISTORY    has no family status information on file. family history is not on file. SOCIAL HISTORY     reports that he has been smoking cigarettes. He has a 2.50 pack-year smoking history. He has never used smokeless tobacco. He reports current drug use. Drug: Marijuana [place]). He reports that he does not drink alcohol. PHYSICAL EXAM      INITIAL VITALS: /67   Pulse 63   Temp 100.1 °F (37.8 °C) (Oral)   Resp 13   Ht 5' 3\" (1.6 m)   Wt 108 lb (49 kg)   SpO2 97%   BMI 19.13 kg/m² Estimated body mass index is 19.13 kg/m² as calculated from the following:    Height as of this encounter: 5' 3\" (1.6 m). Weight as of this encounter: 108 lb (49 kg). Physical Exam  Vitals reviewed. Constitutional:       Appearance: He is well-developed. HENT:      Head: Normocephalic and atraumatic. Right Ear: External ear normal.      Left Ear: External ear normal.      Nose: Nose normal.   Eyes:      General: No scleral icterus. Extraocular Movements: Extraocular movements intact. Conjunctiva/sclera: Conjunctivae normal.      Pupils: Pupils are equal, round, and reactive to light. Neck:      Thyroid: No thyromegaly. Vascular: No JVD. Cardiovascular:      Rate and Rhythm: Normal rate and regular rhythm. Heart sounds: Normal heart sounds. No murmur heard. No friction rub. Pulmonary:      Effort: Pulmonary effort is normal.      Breath sounds: Normal breath sounds. No wheezing or rales. Chest:      Chest wall: No tenderness. Abdominal:      General: Abdomen is flat.  Bowel sounds are normal.      Palpations: Abdomen is soft. There is no mass. Tenderness: There is abdominal tenderness in the epigastric area. Musculoskeletal:      Cervical back: Normal range of motion and neck supple. Lymphadenopathy:      Cervical: No cervical adenopathy. Skin:     General: Skin is warm and dry. Findings: No rash. Neurological:      Mental Status: He is alert and oriented to person, place, and time. Psychiatric:         Behavior: Behavior normal. Behavior is cooperative. MEDICAL DECISION MAKING    DIFFERENTIAL DIAGNOSIS:  GERD, duodenitis gastritis, esophagitis, esophageal varices, cirrhosis, history of hepatitis C ,Boerhaave syndrome, dyspepsia, viral infection. COVID, influenza      DIAGNOSTIC RESULTS    RADIOLOGY:  I have reviewed radiologic plain film image(s). The plain films will be read or overread by the radiologist.All other non-plain film images(s) such as CT, Ultrasound and MRI have been read by the radiologist.  CT ABDOMEN PELVIS W IV CONTRAST Additional Contrast? None   Final Result   Impression:    Prominent nodular consolidation bilateral lung bases. This is likely    infectious. This document has been electronically signed by: Marisa Kayser, MD on    12/05/2022 07:01 PM      All CTs at this facility use dose modulation techniques and iterative    reconstructions, and/or weight-based dosing   when appropriate to reduce radiation to a low as reasonably achievable.       XR CHEST (2 VW)    (Results Pending)       LABS:   Labs Reviewed   CBC WITH AUTO DIFFERENTIAL - Abnormal; Notable for the following components:       Result Value    WBC 14.0 (*)     Hematocrit 41.0 (*)     Segs Absolute 9.9 (*)     All other components within normal limits   BASIC METABOLIC PANEL - Abnormal; Notable for the following components:    Potassium 3.3 (*)     Chloride 96 (*)     Glucose 122 (*)     All other components within normal limits   HEPATIC FUNCTION PANEL - Abnormal; Notable for None.    CONSULTS:  None    PROCEDURES:  None. FINAL IMPRESSION       1. Acute gastritis without hemorrhage, unspecified gastritis type    2. Abdominal pain, epigastric    3. Gastroesophageal reflux disease, unspecified whether esophagitis present    4. History of hepatitis C          DISPOSITION/PLAN  PATIENT REFERRED TO:  Feli Richard, APRN - CNP  [address] [place]  [phone]    Schedule an appointment as soon as possible for a visit in 1 week      [facility] [address] EMERGENCY DEPT  [address]    As needed  DISCHARGE MEDICATIONS:  New Prescriptions    ONDANSETRON (ZOFRAN) 4 MG TABLET    1 tablet every 6-8 hours for nausea vomiting    PANTOPRAZOLE (PROTONIX) 40 MG TABLET    Take 1 tablet by mouth daily         (Please note that portions of this note were completed with a voice recognition program and electronically transcribed. Efforts were [facility] edit the dictations but occasionally words are mis-transcribed . The transcription may contain errors not detected in proofreading.   This transcription was electronically signed.)     12/05/22 8:37 PM      Adry Reyes MD      Emergency room physician             Adry Reyes MD  12/05/22 [id number]       Adry Reyes MD  12/05/22 0330

## 2022-12-05 NOTE — ED TRIAGE NOTES
Pt presents to the ED via EMS with c/o abdominal pain and coughing up blood. Pt reports this has been going on for about a week now. Pt reports going to his PCP today and was told to come here. Pt reports the pain only occurs when twisting and moving. Pt reports being sick last week and has also lost 7 lbs in a week. Pt is concerned and would like checked out.

## 2022-12-08 ENCOUNTER — OFFICE VISIT (OUTPATIENT)
Dept: FAMILY MEDICINE CLINIC | Age: 32
End: 2022-12-08
Payer: MEDICAID

## 2022-12-08 VITALS
HEIGHT: 63 IN | SYSTOLIC BLOOD PRESSURE: 128 MMHG | OXYGEN SATURATION: 98 % | RESPIRATION RATE: 14 BRPM | TEMPERATURE: 100.5 F | WEIGHT: 106.2 LBS | HEART RATE: 108 BPM | BODY MASS INDEX: 18.82 KG/M2 | DIASTOLIC BLOOD PRESSURE: 72 MMHG

## 2022-12-08 DIAGNOSIS — R11.2 NAUSEA AND VOMITING, UNSPECIFIED VOMITING TYPE: ICD-10-CM

## 2022-12-08 DIAGNOSIS — R19.7 DIARRHEA, UNSPECIFIED TYPE: ICD-10-CM

## 2022-12-08 DIAGNOSIS — R50.9 FEVER, UNSPECIFIED FEVER CAUSE: ICD-10-CM

## 2022-12-08 DIAGNOSIS — R63.4 WEIGHT LOSS: Primary | ICD-10-CM

## 2022-12-08 PROCEDURE — G8484 FLU IMMUNIZE NO ADMIN: HCPCS | Performed by: NURSE PRACTITIONER

## 2022-12-08 PROCEDURE — 4004F PT TOBACCO SCREEN RCVD TLK: CPT | Performed by: NURSE PRACTITIONER

## 2022-12-08 PROCEDURE — G8427 DOCREV CUR MEDS BY ELIG CLIN: HCPCS | Performed by: NURSE PRACTITIONER

## 2022-12-08 PROCEDURE — 99213 OFFICE O/P EST LOW 20 MIN: CPT | Performed by: NURSE PRACTITIONER

## 2022-12-08 PROCEDURE — G8420 CALC BMI NORM PARAMETERS: HCPCS | Performed by: NURSE PRACTITIONER

## 2022-12-08 NOTE — PROGRESS NOTES
Gerardo Odell is a 34 y.o. male thatpresents for Discuss Labs      History obtained today from Patient. Follow up from ER for gastroenteritis  Was coughing up blood, vomiting, diarrhea, and abdominal pain  Hx Hep C, follows with Dr. Dominic Cedeno  Notes some fever and chills still  Overall thinks he is starting to improve some  Trying to push fluids    I have reviewed the patient's past medical history, past surgical history, allergies,medications, social and family history and I have made updates where appropriate. 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 No pertinent family history. Review of Systems        PHYSICAL EXAM:  /72   Pulse (!) 108   Temp (!) 100.5 °F (38.1 °C) (Temporal)   Resp 14   Ht 5' 3\" (1.6 m)   Wt 106 lb 3.2 oz (48.2 kg)   SpO2 98%   BMI 18.81 kg/m²     Physical Exam  Constitutional:       Appearance: Normal appearance. HENT:      Head: Normocephalic and atraumatic. Right Ear: External ear normal.      Left Ear: External ear normal.      Nose: Nose normal.      Mouth/Throat:      Mouth: Mucous membranes are moist.   Eyes:      Conjunctiva/sclera: Conjunctivae normal.   Cardiovascular:      Rate and Rhythm: Normal rate and regular rhythm. Pulses: Normal pulses. Heart sounds: Normal heart sounds. Pulmonary:      Effort: Pulmonary effort is normal.      Breath sounds: Normal breath sounds. Abdominal:      General: Bowel sounds are normal.      Palpations: Abdomen is soft. Musculoskeletal:         General: Normal range of motion. Cervical back: Normal range of motion.    Skin: General: Skin is warm and dry. Neurological:      General: No focal deficit present. Mental Status: He is alert and oriented to person, place, and time. Psychiatric:         Mood and Affect: Mood normal.         Behavior: Behavior normal.         ASSESSMENT & PLAN  Chico was seen today for discuss labs. Diagnoses and all orders for this visit:    Weight loss  -     Gastrointestinal Panel, Molecular; Future    Nausea and vomiting, unspecified vomiting type  -     Gastrointestinal Panel, Molecular; Future    Diarrhea, unspecified type  -     Gastrointestinal Panel, Molecular; Future    Fever, unspecified fever cause  -     Gastrointestinal Panel, Molecular; Future      No follow-ups on file. Obtain above testing    All copied or forwarded information in the progress note was verified by me to be accurate at the time of visit  Patient's past medical, surgical, social and family history were reviewed and updated     All patient questions answered. Patient voiced understanding.

## 2022-12-08 NOTE — PATIENT INSTRUCTIONS
?  [facility]  [address] [facility], [address]  P: [phone]  Fax:  [phone]  Hours:  Mon-Thurs:  6:00AM-6:00PM  Friday: 6:00AM-4:00PM  Saturday: 06:30AM- NOON

## 2022-12-09 ENCOUNTER — NURSE ONLY (OUTPATIENT)
Dept: LAB | Age: 32
End: 2022-12-09

## 2022-12-09 DIAGNOSIS — R19.7 DIARRHEA, UNSPECIFIED TYPE: ICD-10-CM

## 2022-12-09 DIAGNOSIS — R50.9 FEVER, UNSPECIFIED FEVER CAUSE: ICD-10-CM

## 2022-12-09 DIAGNOSIS — R63.4 WEIGHT LOSS: ICD-10-CM

## 2022-12-09 DIAGNOSIS — R11.2 NAUSEA AND VOMITING, UNSPECIFIED VOMITING TYPE: ICD-10-CM

## 2022-12-09 LAB
ADENOVIRUS F 40 41 PCR: NOT DETECTED
ASTROVIRUS PCR: NOT DETECTED
CAMPYLOBACTER PCR: NOT DETECTED
CLOSTRIDIUM DIFFICILE, PCR: NOT DETECTED
CRYPTOSPORIDIUM PCR: NOT DETECTED
CYCLOSPORA CAYETANENSIS PCR: NOT DETECTED
E COLI 0157 PCR: NORMAL
E COLI ENTEROAGGREGATIVE PCR: NOT DETECTED
E COLI ENTEROPATHOGENIC PCR: NOT DETECTED
E COLI ENTEROTOXIGENIC PCR: NOT DETECTED
E COLI SHIGA LIKE TOXIN PCR: NOT DETECTED
E COLI SHIGELLA/ENTEROINVASIVE PCR: NOT DETECTED
E HISTOLYTICA GI FILM ARRAY: NOT DETECTED
GIARDIA LAMBLIA PCR: NOT DETECTED
NOROVIRUS GI GII PCR: NOT DETECTED
PLESIOMONAS SHIGELLOIDES PCR: NOT DETECTED
ROTAVIRUS A PCR: NOT DETECTED
SALMONELLA PCR: NOT DETECTED
SAPOVIRUS PCR: NOT DETECTED
VIBRIO CHOLERAE PCR: NOT DETECTED
VIBRIO PCR: NOT DETECTED
YERSINIA ENTEROCOLITICA PCR: NOT DETECTED

## 2022-12-12 ENCOUNTER — TELEPHONE (OUTPATIENT)
Dept: FAMILY MEDICINE CLINIC | Age: 32
End: 2022-12-12

## 2022-12-12 NOTE — TELEPHONE ENCOUNTER
I called and spoke with Chico and he verbalized understanding and states that he is feeling much better and he is not having any issues right now.

## 2022-12-12 NOTE — TELEPHONE ENCOUNTER
----- Message from KATINA Segura CNP sent at 12/9/2022  3:14 PM EST -----  Stool was negative  How is he feeling?

## 2023-01-31 DIAGNOSIS — M25.50 ARTHRALGIA, UNSPECIFIED JOINT: ICD-10-CM

## 2023-01-31 RX ORDER — GABAPENTIN 100 MG/1
CAPSULE ORAL
Qty: 90 CAPSULE | Refills: 3 | Status: CANCELLED | OUTPATIENT
Start: 2023-01-31 | End: 2023-05-31

## 2023-01-31 NOTE — TELEPHONE ENCOUNTER
Patient requesting the following   Please approve or refuse Rx request:  Requested Prescriptions     Pending Prescriptions Disp Refills    gabapentin (NEURONTIN) 100 MG capsule 90 capsule 3     Sig: TAKE 1 CAPSULE BY MOUTH THREE TIMES DAILY       Next appointment:  Visit date not found

## 2023-02-17 DIAGNOSIS — M25.50 ARTHRALGIA, UNSPECIFIED JOINT: ICD-10-CM

## 2023-02-17 RX ORDER — GABAPENTIN 100 MG/1
CAPSULE ORAL
Qty: 90 CAPSULE | Refills: 0 | Status: SHIPPED | OUTPATIENT
Start: 2023-02-17 | End: 2023-06-17

## 2023-02-17 NOTE — TELEPHONE ENCOUNTER
Recent Visits  Date Type Provider Dept   12/08/22 Office Visit Germán Mcgee, APRN - CNP [facility]  [facility]   07/26/22 Office Visit Germán Mcgee, APRN - CNP [facility]  [facility]   10/20/21 Office Visit Germán Mcgee, APRN - CNP [facility]  [address] recent visits within past 540 days with a meds authorizing provider and meeting all other requirements  Future Appointments  No visits were found meeting these conditions. Showing future appointments within next 150 days with a meds authorizing provider and meeting all other requirements      No future appointments.

## 2023-02-17 NOTE — TELEPHONE ENCOUNTER
Patient has been informed and voiced understanding   Future Appointments   Date Time Provider Migel Echevarria   3/13/2023 10:40 AM Joselito Schulte

## 2023-03-06 RX ORDER — PANTOPRAZOLE SODIUM 40 MG/1
40 TABLET, DELAYED RELEASE ORAL DAILY
Qty: 90 TABLET | Refills: 0 | Status: SHIPPED | OUTPATIENT
Start: 2023-03-06

## 2023-03-06 NOTE — TELEPHONE ENCOUNTER
----- Message from Nicola Conroy sent at 3/6/2023 11:53 AM EST -----  Subject: Refill Request    QUESTIONS  Name of Medication? pantoprazole (PROTONIX) 40 MG tablet  Patient-reported dosage and instructions? Take 1 tablet by mouth daily  How many days do you have left? 1  Preferred Pharmacy? [address] phone number (if available)? [phone]  Additional Information for Provider? Pt only has 1 day of medication left   until his resched appt on 03/15/23  ---------------------------------------------------------------------------  --------------  CALL BACK INFO  What is the best way for the office to contact you? OK to leave message on   voicemail  Preferred Call Back Phone Number? [phone]  ---------------------------------------------------------------------------  --------------  SCRIPT ANSWERS  Relationship to Patient?  Self

## 2023-03-22 DIAGNOSIS — M25.50 ARTHRALGIA, UNSPECIFIED JOINT: ICD-10-CM

## 2023-03-22 RX ORDER — GABAPENTIN 100 MG/1
CAPSULE ORAL
Qty: 90 CAPSULE | Refills: 0 | Status: SHIPPED | OUTPATIENT
Start: 2023-03-22 | End: 2023-07-20

## 2023-03-22 NOTE — TELEPHONE ENCOUNTER
----- Message from Georgie Browne sent at 3/22/2023 12:56 PM EDT -----  Subject: Refill Request    QUESTIONS  Name of Medication? gabapentin (NEURONTIN) 100 MG capsule  Patient-reported dosage and instructions? TAKE 1 CAPSULE BY MOUTH THREE   TIMES DAILY  How many days do you have left? 0  Preferred Pharmacy? [address] phone number (if available)? [phone]  ---------------------------------------------------------------------------  --------------  CALL BACK INFO  What is the best way for the office to contact you? OK to leave message on   voicemail  Preferred Call Back Phone Number? [phone]  ---------------------------------------------------------------------------  --------------  SCRIPT ANSWERS  Relationship to Patient?  Self

## 2023-04-03 ENCOUNTER — OFFICE VISIT (OUTPATIENT)
Dept: FAMILY MEDICINE CLINIC | Age: 33
End: 2023-04-03
Payer: MEDICAID

## 2023-04-03 VITALS
SYSTOLIC BLOOD PRESSURE: 128 MMHG | OXYGEN SATURATION: 97 % | HEIGHT: 63 IN | TEMPERATURE: 97.8 F | BODY MASS INDEX: 20.91 KG/M2 | RESPIRATION RATE: 16 BRPM | WEIGHT: 118 LBS | HEART RATE: 116 BPM | DIASTOLIC BLOOD PRESSURE: 84 MMHG

## 2023-04-03 DIAGNOSIS — R53.83 FATIGUE, UNSPECIFIED TYPE: ICD-10-CM

## 2023-04-03 DIAGNOSIS — M25.50 ARTHRALGIA, UNSPECIFIED JOINT: ICD-10-CM

## 2023-04-03 DIAGNOSIS — K21.9 GASTROESOPHAGEAL REFLUX DISEASE, UNSPECIFIED WHETHER ESOPHAGITIS PRESENT: ICD-10-CM

## 2023-04-03 DIAGNOSIS — B19.20 HEPATITIS C TEST POSITIVE: Primary | ICD-10-CM

## 2023-04-03 DIAGNOSIS — F11.20 CONTINUOUS OPIOID DEPENDENCE (HCC): ICD-10-CM

## 2023-04-03 PROCEDURE — 4004F PT TOBACCO SCREEN RCVD TLK: CPT | Performed by: NURSE PRACTITIONER

## 2023-04-03 PROCEDURE — 99214 OFFICE O/P EST MOD 30 MIN: CPT | Performed by: NURSE PRACTITIONER

## 2023-04-03 PROCEDURE — G8427 DOCREV CUR MEDS BY ELIG CLIN: HCPCS | Performed by: NURSE PRACTITIONER

## 2023-04-03 PROCEDURE — G8420 CALC BMI NORM PARAMETERS: HCPCS | Performed by: NURSE PRACTITIONER

## 2023-04-03 RX ORDER — HYDROXYZINE PAMOATE 25 MG/1
25 CAPSULE ORAL EVERY 6 HOURS PRN
COMMUNITY
Start: 2023-03-15

## 2023-04-03 RX ORDER — GABAPENTIN 300 MG/1
300 CAPSULE ORAL 3 TIMES DAILY
Qty: 270 CAPSULE | Refills: 1 | Status: SHIPPED | OUTPATIENT
Start: 2023-04-03 | End: 2023-09-30

## 2023-04-03 RX ORDER — NALOXONE HYDROCHLORIDE 4 MG/.1ML
SPRAY NASAL
COMMUNITY
Start: 2023-01-10

## 2023-04-03 RX ORDER — FAMOTIDINE 20 MG/1
20 TABLET, FILM COATED ORAL 2 TIMES DAILY PRN
Qty: 180 TABLET | Refills: 1 | Status: SHIPPED | OUTPATIENT
Start: 2023-04-03

## 2023-04-03 RX ORDER — MIRTAZAPINE 7.5 MG/1
7.5 TABLET, FILM COATED ORAL NIGHTLY
COMMUNITY
Start: 2023-03-15

## 2023-04-03 ASSESSMENT — PATIENT HEALTH QUESTIONNAIRE - PHQ9
SUM OF ALL RESPONSES TO PHQ QUESTIONS 1-9: 0
1. LITTLE INTEREST OR PLEASURE IN DOING THINGS: 0
SUM OF ALL RESPONSES TO PHQ9 QUESTIONS 1 & 2: 0
2. FEELING DOWN, DEPRESSED OR HOPELESS: 0

## 2023-04-03 NOTE — PROGRESS NOTES
Skin:     General: Skin is warm and dry. Neurological:      General: No focal deficit present. Mental Status: He is alert and oriented to person, place, and time. Psychiatric:         Mood and Affect: Mood normal.         Behavior: Behavior normal.         ASSESSMENT & PLAN  Chico was seen today for discuss medications and weight management. Diagnoses and all orders for this visit:    Hepatitis C test positive  -     gabapentin (NEURONTIN) 300 MG capsule; Take 1 capsule by mouth 3 times daily for 180 days. -     Comprehensive Metabolic Panel; Future  -     TSH; Future  -     T4, Free; Future  -     Vitamin D 25 Hydroxy; Future  -     Vitamin B12; Future  -     CBC with Auto Differential; Future    Arthralgia, unspecified joint  -     gabapentin (NEURONTIN) 300 MG capsule; Take 1 capsule by mouth 3 times daily for 180 days. -     Comprehensive Metabolic Panel; Future  -     TSH; Future  -     T4, Free; Future  -     Vitamin D 25 Hydroxy; Future  -     Vitamin B12; Future  -     CBC with Auto Differential; Future    Fatigue, unspecified type  -     gabapentin (NEURONTIN) 300 MG capsule; Take 1 capsule by mouth 3 times daily for 180 days. -     Comprehensive Metabolic Panel; Future  -     TSH; Future  -     T4, Free; Future  -     Vitamin D 25 Hydroxy; Future  -     Vitamin B12; Future  -     CBC with Auto Differential; Future    Continuous opioid dependence (HCC)  -     gabapentin (NEURONTIN) 300 MG capsule; Take 1 capsule by mouth 3 times daily for 180 days. -     Comprehensive Metabolic Panel; Future  -     TSH; Future  -     T4, Free; Future  -     Vitamin D 25 Hydroxy; Future  -     Vitamin B12; Future  -     CBC with Auto Differential; Future    Gastroesophageal reflux disease, unspecified whether esophagitis present  -     famotidine (PEPCID) 20 MG tablet;  Take 1 tablet by mouth 2 times daily as needed (acid reflux)    Will check back in in a month for GERD and see if Gabapentin increase is

## 2023-05-02 ENCOUNTER — OFFICE VISIT (OUTPATIENT)
Dept: FAMILY MEDICINE CLINIC | Age: 33
End: 2023-05-02
Payer: MEDICAID

## 2023-05-02 VITALS
RESPIRATION RATE: 16 BRPM | HEART RATE: 73 BPM | SYSTOLIC BLOOD PRESSURE: 130 MMHG | DIASTOLIC BLOOD PRESSURE: 82 MMHG | HEIGHT: 63 IN | TEMPERATURE: 97.9 F | BODY MASS INDEX: 21.09 KG/M2 | WEIGHT: 119 LBS | OXYGEN SATURATION: 97 %

## 2023-05-02 DIAGNOSIS — R63.4 WEIGHT LOSS: ICD-10-CM

## 2023-05-02 DIAGNOSIS — K21.9 GASTROESOPHAGEAL REFLUX DISEASE, UNSPECIFIED WHETHER ESOPHAGITIS PRESENT: Primary | ICD-10-CM

## 2023-05-02 DIAGNOSIS — K42.9 UMBILICAL HERNIA WITHOUT OBSTRUCTION AND WITHOUT GANGRENE: ICD-10-CM

## 2023-05-02 DIAGNOSIS — B19.20 HEPATITIS C TEST POSITIVE: ICD-10-CM

## 2023-05-02 DIAGNOSIS — Z72.0 TOBACCO ABUSE: ICD-10-CM

## 2023-05-02 DIAGNOSIS — M54.32 BILATERAL SCIATICA: ICD-10-CM

## 2023-05-02 DIAGNOSIS — M54.31 BILATERAL SCIATICA: ICD-10-CM

## 2023-05-02 PROCEDURE — G8420 CALC BMI NORM PARAMETERS: HCPCS | Performed by: NURSE PRACTITIONER

## 2023-05-02 PROCEDURE — G8427 DOCREV CUR MEDS BY ELIG CLIN: HCPCS | Performed by: NURSE PRACTITIONER

## 2023-05-02 PROCEDURE — 99214 OFFICE O/P EST MOD 30 MIN: CPT | Performed by: NURSE PRACTITIONER

## 2023-05-02 PROCEDURE — 4004F PT TOBACCO SCREEN RCVD TLK: CPT | Performed by: NURSE PRACTITIONER

## 2023-05-02 RX ORDER — NICOTINE 21 MG/24HR
1 PATCH, TRANSDERMAL 24 HOURS TRANSDERMAL DAILY
Qty: 30 PATCH | Refills: 1 | Status: SHIPPED | OUTPATIENT
Start: 2023-05-02 | End: 2023-07-01

## 2023-05-02 NOTE — PROGRESS NOTES
Mely Arceo is a 34 y.o. male thatpresents for 1 Month Follow-Up      History obtained today from Patient. Recheck on GERD symptoms and pain since increasing his Gabapentin  Doing a lot better with GERD and pain is more controlled      Does report a recurring umbilical hernia  Would like to see someone about this  Unable to reproduce    Still smoking, would like to quit at some point   Went from 2 pack per day to 6 cigarettes per day   Feels like he is ready to try    I have reviewed the patient's past medical history, past surgical history, allergies,medications, social and family history and I have made updates where appropriate. 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 No pertinent family history. Review of Systems        PHYSICAL EXAM:  /82   Pulse 73   Temp 97.9 °F (36.6 °C) (Infrared)   Resp 16   Ht 5' 3\" (1.6 m)   Wt 119 lb (54 kg)   SpO2 97%   BMI 21.08 kg/m²     Physical Exam  Constitutional:       Appearance: Normal appearance. HENT:      Head: Normocephalic and atraumatic. Right Ear: External ear normal.      Left Ear: External ear normal.      Nose: Nose normal.      Mouth/Throat:      Mouth: Mucous membranes are moist.   Eyes:      Conjunctiva/sclera: Conjunctivae normal.   Cardiovascular:      Rate and Rhythm: Normal rate and regular rhythm. Pulses: Normal pulses. Heart sounds: Normal heart sounds. Pulmonary:      Effort: Pulmonary effort is normal.      Breath sounds: Normal breath sounds. Abdominal:      General: Bowel sounds are normal.      Palpations: Abdomen is soft.

## 2023-05-04 PROBLEM — F41.9 ANXIETY DISORDER: Status: ACTIVE | Noted: 2020-05-20

## 2023-06-18 ENCOUNTER — HOSPITAL ENCOUNTER (EMERGENCY)
Age: 33
Discharge: HOME | End: 2023-06-18
Attending: FAMILY MEDICINE
Payer: MEDICAID

## 2023-06-18 VITALS
SYSTOLIC BLOOD PRESSURE: 92 MMHG | TEMPERATURE: 98.5 F | DIASTOLIC BLOOD PRESSURE: 80 MMHG | RESPIRATION RATE: 11 BRPM | OXYGEN SATURATION: 97 % | HEART RATE: 71 BPM

## 2023-06-18 DIAGNOSIS — F19.10 POLYSUBSTANCE ABUSE (HCC): Primary | ICD-10-CM

## 2023-06-18 DIAGNOSIS — R41.82 ALTERED MENTAL STATUS, UNSPECIFIED ALTERED MENTAL STATUS TYPE: ICD-10-CM

## 2023-06-18 LAB
ALBUMIN SERPL BCG-MCNC: 4.4 G/DL (ref 3.5–5.1)
ALP SERPL-CCNC: 73 U/L (ref 38–126)
ALT SERPL W/O P-5'-P-CCNC: 13 U/L (ref 11–66)
ANION GAP SERPL CALC-SCNC: 13 MEQ/L (ref 8–16)
AST SERPL-CCNC: 18 U/L (ref 5–40)
BASOPHILS ABSOLUTE: 0.1 THOU/MM3 (ref 0–0.1)
BASOPHILS NFR BLD AUTO: 0.7 %
BILIRUB CONJ SERPL-MCNC: < 0.2 MG/DL (ref 0–0.3)
BILIRUB SERPL-MCNC: 0.2 MG/DL (ref 0.3–1.2)
BUN SERPL-MCNC: 20 MG/DL (ref 7–22)
CALCIUM SERPL-MCNC: 8.8 MG/DL (ref 8.5–10.5)
CHLORIDE SERPL-SCNC: 100 MEQ/L (ref 98–111)
CO2 SERPL-SCNC: 26 MEQ/L (ref 23–33)
CREAT SERPL-MCNC: 0.9 MG/DL (ref 0.4–1.2)
DEPRECATED RDW RBC AUTO: 47 FL (ref 35–45)
EOSINOPHIL NFR BLD AUTO: 1.7 %
EOSINOPHILS ABSOLUTE: 0.2 THOU/MM3 (ref 0–0.4)
ERYTHROCYTE [DISTWIDTH] IN BLOOD BY AUTOMATED COUNT: 14.1 % (ref 11.5–14.5)
GFR SERPL CREATININE-BSD FRML MDRD: > 60 ML/MIN/1.73M2
GLUCOSE SERPL-MCNC: 181 MG/DL (ref 70–108)
HCT VFR BLD AUTO: 45.8 % (ref 42–52)
HGB BLD-MCNC: 14.7 GM/DL (ref 14–18)
IMM GRANULOCYTES # BLD AUTO: 0.04 THOU/MM3 (ref 0–0.07)
IMM GRANULOCYTES NFR BLD AUTO: 0.5 %
LYMPHOCYTES ABSOLUTE: 2.3 THOU/MM3 (ref 1–4.8)
LYMPHOCYTES NFR BLD AUTO: 26 %
MCH RBC QN AUTO: 29.2 PG (ref 26–33)
MCHC RBC AUTO-ENTMCNC: 32.1 GM/DL (ref 32.2–35.5)
MCV RBC AUTO: 90.9 FL (ref 80–94)
MONOCYTES ABSOLUTE: 0.4 THOU/MM3 (ref 0.4–1.3)
MONOCYTES NFR BLD AUTO: 5 %
NEUTROPHILS NFR BLD AUTO: 66.1 %
NRBC BLD AUTO-RTO: 0 /100 WBC
OSMOLALITY SERPL CALC.SUM OF ELEC: 284.7 MOSMOL/KG (ref 275–300)
PLATELET # BLD AUTO: 274 THOU/MM3 (ref 130–400)
PMV BLD AUTO: 9.9 FL (ref 9.4–12.4)
POTASSIUM SERPL-SCNC: 3.8 MEQ/L (ref 3.5–5.2)
PROT SERPL-MCNC: 7.3 G/DL (ref 6.1–8)
RBC # BLD AUTO: 5.04 MILL/MM3 (ref 4.7–6.1)
SEGMENTED NEUTROPHILS ABSOLUTE COUNT: 5.9 THOU/MM3 (ref 1.8–7.7)
SODIUM SERPL-SCNC: 139 MEQ/L (ref 135–145)
WBC # BLD AUTO: 8.9 THOU/MM3 (ref 4.8–10.8)

## 2023-06-18 PROCEDURE — 99284 EMERGENCY DEPT VISIT MOD MDM: CPT

## 2023-06-18 PROCEDURE — 93005 ELECTROCARDIOGRAM TRACING: CPT | Performed by: FAMILY MEDICINE

## 2023-06-18 PROCEDURE — 80053 COMPREHEN METABOLIC PANEL: CPT

## 2023-06-18 PROCEDURE — 85025 COMPLETE CBC W/AUTO DIFF WBC: CPT

## 2023-06-18 PROCEDURE — 82248 BILIRUBIN DIRECT: CPT

## 2023-06-18 PROCEDURE — 36415 COLL VENOUS BLD VENIPUNCTURE: CPT

## 2023-06-19 PROCEDURE — 93010 ELECTROCARDIOGRAM REPORT: CPT | Performed by: NUCLEAR MEDICINE

## 2023-06-20 ENCOUNTER — TELEPHONE (OUTPATIENT)
Dept: FAMILY MEDICINE CLINIC | Age: 33
End: 2023-06-20

## 2023-06-20 LAB
EKG ATRIAL RATE: 82 BPM
EKG P AXIS: 70 DEGREES
EKG P-R INTERVAL: 166 MS
EKG Q-T INTERVAL: 392 MS
EKG QRS DURATION: 98 MS
EKG QTC CALCULATION (BAZETT): 457 MS
EKG R AXIS: 91 DEGREES
EKG T AXIS: 66 DEGREES
EKG VENTRICULAR RATE: 82 BPM

## 2023-06-20 NOTE — TELEPHONE ENCOUNTER
----- Message from Eloina Song sent at 6/20/2023 12:35 PM EDT -----  Subject: Referral Request    Reason for referral request? Unable to gain weight/body sches  Provider patient wants to be referred to(if known):     Provider Phone Number(if known): Additional Information for Provider?  Unsure of specialist would like if   clinical staff could call back with questions and concerns  ---------------------------------------------------------------------------  --------------  [address] [employer]    [phone]; OK to leave message on voicemail  ---------------------------------------------------------------------------  --------------

## 2023-06-21 ENCOUNTER — OFFICE VISIT (OUTPATIENT)
Dept: FAMILY MEDICINE CLINIC | Age: 33
End: 2023-06-21
Payer: MEDICAID

## 2023-06-21 VITALS
SYSTOLIC BLOOD PRESSURE: 138 MMHG | DIASTOLIC BLOOD PRESSURE: 88 MMHG | RESPIRATION RATE: 16 BRPM | BODY MASS INDEX: 20.73 KG/M2 | OXYGEN SATURATION: 96 % | HEIGHT: 63 IN | WEIGHT: 117 LBS | TEMPERATURE: 97.2 F | HEART RATE: 116 BPM

## 2023-06-21 DIAGNOSIS — F11.11 HX OF OPIOID ABUSE (HCC): ICD-10-CM

## 2023-06-21 DIAGNOSIS — K21.9 GASTROESOPHAGEAL REFLUX DISEASE, UNSPECIFIED WHETHER ESOPHAGITIS PRESENT: Primary | ICD-10-CM

## 2023-06-21 DIAGNOSIS — R63.4 UNINTENTIONAL WEIGHT LOSS: ICD-10-CM

## 2023-06-21 DIAGNOSIS — F41.9 ANXIETY DISORDER, UNSPECIFIED TYPE: ICD-10-CM

## 2023-06-21 LAB
AMPHETAMINES UR QL SCN: NEGATIVE
BARBITURATES UR QL SCN: NEGATIVE
BENZODIAZ UR QL SCN: POSITIVE
BILIRUBIN URINE: ABNORMAL
BLOOD URINE, POC: NEGATIVE
BZE UR QL SCN: NEGATIVE
CANNABINOIDS UR QL SCN: POSITIVE
CHARACTER, URINE: CLEAR
COLOR, URINE: YELLOW
COMPREHENSIVE DRUG PROMPT: NORMAL
FENTANYL: POSITIVE
GLUCOSE URINE: NEGATIVE MG/DL
KETONES, URINE: NEGATIVE
LEUKOCYTE CLUMPS, URINE: NEGATIVE
NITRITE, URINE: NEGATIVE
OPIATES UR QL SCN: NEGATIVE
OXYCODONE: NEGATIVE
PCP UR QL SCN: NEGATIVE
PH, URINE: 5.5 (ref 5–9)
PROTEIN, URINE: 30 MG/DL
SPECIFIC GRAVITY, URINE: >= 1.03 (ref 1–1.03)
UROBILINOGEN, URINE: 0.2 EU/DL (ref 0–1)

## 2023-06-21 PROCEDURE — 99215 OFFICE O/P EST HI 40 MIN: CPT | Performed by: NURSE PRACTITIONER

## 2023-06-21 PROCEDURE — 4004F PT TOBACCO SCREEN RCVD TLK: CPT | Performed by: NURSE PRACTITIONER

## 2023-06-21 PROCEDURE — G8427 DOCREV CUR MEDS BY ELIG CLIN: HCPCS | Performed by: NURSE PRACTITIONER

## 2023-06-21 PROCEDURE — G8420 CALC BMI NORM PARAMETERS: HCPCS | Performed by: NURSE PRACTITIONER

## 2023-06-21 RX ORDER — PANTOPRAZOLE SODIUM 40 MG/1
40 TABLET, DELAYED RELEASE ORAL DAILY
Qty: 90 TABLET | Refills: 3 | Status: SHIPPED | OUTPATIENT
Start: 2023-06-21

## 2023-06-21 RX ORDER — HYDROXYZINE PAMOATE 25 MG/1
50 CAPSULE ORAL EVERY 6 HOURS PRN
Qty: 240 CAPSULE | Refills: 1 | Status: SHIPPED | OUTPATIENT
Start: 2023-06-21 | End: 2023-08-20

## 2023-06-21 RX ORDER — MIRTAZAPINE 15 MG/1
15 TABLET, FILM COATED ORAL NIGHTLY
Qty: 90 TABLET | Refills: 3 | Status: SHIPPED | OUTPATIENT
Start: 2023-06-21

## 2023-06-21 NOTE — PROGRESS NOTES
Mucous membranes are moist.   Eyes:      Conjunctiva/sclera: Conjunctivae normal.   Cardiovascular:      Rate and Rhythm: Normal rate and regular rhythm. Pulses: Normal pulses. Heart sounds: Normal heart sounds. Pulmonary:      Effort: Pulmonary effort is normal.      Breath sounds: Normal breath sounds. Abdominal:      General: Bowel sounds are normal.      Palpations: Abdomen is soft. Musculoskeletal:         General: Normal range of motion. Cervical back: Normal range of motion. Skin:     General: Skin is warm and dry. Neurological:      General: No focal deficit present. Mental Status: He is alert and oriented to person, place, and time. Psychiatric:         Mood and Affect: Mood normal.         Behavior: Behavior normal.         ASSESSMENT & PLAN  Chico was seen today for generalized body aches and weight loss. Diagnoses and all orders for this visit:    Gastroesophageal reflux disease, unspecified whether esophagitis present  -     pantoprazole (PROTONIX) 40 MG tablet; Take 1 tablet by mouth daily    Anxiety disorder, unspecified type  -     mirtazapine (REMERON) 15 MG tablet; Take 1 tablet by mouth nightly at bedtime.  -     hydrOXYzine pamoate (VISTARIL) 25 MG capsule; Take 2 capsules by mouth every 6 hours as needed for Anxiety    Hx of opioid abuse ([facility] [address]  -     Echo 2d w doppler w color w contrast; Future    Unintentional weight loss  -     [facility] - Dietitian  -     Urine Drug Screen, Comprehensive    Other orders  -     POCT Urinalysis No Micro (Auto)      Will dip urine in the office today for blood, r/o infection  Will send for further testing    I spent 40+ minutes reviewing previous notes and testing, discussing symptoms, medications and side effects, treatment options with the patient, performing an exam, and developing a plan of care. All questions answered. Patient verbalized understanding. No follow-ups on file.     Start above

## 2023-06-27 ENCOUNTER — OFFICE VISIT (OUTPATIENT)
Dept: FAMILY MEDICINE CLINIC | Age: 33
End: 2023-06-27

## 2023-06-27 VITALS
DIASTOLIC BLOOD PRESSURE: 82 MMHG | SYSTOLIC BLOOD PRESSURE: 104 MMHG | WEIGHT: 118 LBS | HEIGHT: 63 IN | OXYGEN SATURATION: 97 % | HEART RATE: 88 BPM | TEMPERATURE: 97 F | RESPIRATION RATE: 16 BRPM | BODY MASS INDEX: 20.91 KG/M2

## 2023-06-27 DIAGNOSIS — R52 BODY ACHES: ICD-10-CM

## 2023-06-27 DIAGNOSIS — M25.50 DIFFUSE ARTHRALGIA: ICD-10-CM

## 2023-06-27 DIAGNOSIS — R82.998 DARK URINE: Primary | ICD-10-CM

## 2023-06-27 DIAGNOSIS — R06.83 SNORING: ICD-10-CM

## 2023-06-27 DIAGNOSIS — R53.83 OTHER FATIGUE: ICD-10-CM

## 2023-06-27 LAB
ABSOLUTE BASO #: 0.06 K/UL (ref 0–0.2)
ABSOLUTE EOS #: 0.05 K/UL (ref 0–0.5)
ABSOLUTE LYMPH #: 2.48 K/UL (ref 1–4)
ABSOLUTE MONO #: 0.46 K/UL (ref 0.2–1)
ABSOLUTE NEUT #: 4.37 K/UL (ref 1.5–7.5)
ALBUMIN SERPL-MCNC: 4.9 G/DL (ref 3.5–5.2)
ALK PHOSPHATASE: 78 U/L (ref 40–112)
ALT SERPL-CCNC: 16 U/L (ref 5–50)
ANION GAP SERPL CALCULATED.3IONS-SCNC: 11 MEQ/L (ref 7–16)
AST SERPL-CCNC: 22 U/L (ref 9–50)
BASOPHILS RELATIVE PERCENT: 0.8 %
BILIRUB SERPL-MCNC: 0.7 MG/DL
BILIRUBIN URINE: ABNORMAL
BLOOD URINE, POC: NEGATIVE
BUN BLDV-MCNC: 18 MG/DL (ref 6–20)
C-REACTIVE PROTEIN: <0.3 MG/DL
CALCIUM SERPL-MCNC: 9.4 MG/DL (ref 8.5–10.5)
CHARACTER, URINE: CLEAR
CHLORIDE BLD-SCNC: 103 MEQ/L (ref 95–107)
CO2: 25 MEQ/L (ref 19–31)
COLOR, URINE: ABNORMAL
CREAT SERPL-MCNC: 0.92 MG/DL (ref 0.8–1.4)
EGFR IF NONAFRICAN AMERICAN: 112 ML/MIN/1.73
EOSINOPHILS RELATIVE PERCENT: 0.7 %
GLUCOSE URINE: NEGATIVE MG/DL
GLUCOSE: 99 MG/DL (ref 70–99)
HCT VFR BLD CALC: 43 % (ref 40–51)
HEMOGLOBIN: 14.7 G/DL (ref 13.5–17)
KETONES, URINE: NEGATIVE
LEUKOCYTE CLUMPS, URINE: NEGATIVE
LYMPHOCYTE %: 33.4 %
MCH RBC QN AUTO: 29.6 PG (ref 25–33)
MCHC RBC AUTO-ENTMCNC: 34.2 G/DL (ref 31–36)
MCV RBC AUTO: 86.7 FL (ref 80–99)
MONOCYTES # BLD: 6.2 %
NEUTROPHILS RELATIVE PERCENT: 58.8 %
NITRITE, URINE: NEGATIVE
PDW BLD-RTO: 13.6 % (ref 11.5–15)
PH, URINE: 5.5 (ref 5–9)
PLATELETS: 259 K/UL (ref 130–400)
PMV BLD AUTO: 11.1 FL (ref 9.3–13)
POTASSIUM SERPL-SCNC: 4.1 MEQ/L (ref 3.5–5.4)
PROTEIN, URINE: 30 MG/DL
RBC: 4.96 M/UL (ref 4.5–6.1)
RHEUMATOID FACTOR: <10 IU/ML
SEDIMENTATION RATE, ERYTHROCYTE: 9 MM/HR (ref 0–15)
SODIUM BLD-SCNC: 139 MEQ/L (ref 133–146)
SPECIFIC GRAVITY, URINE: >= 1.03 (ref 1–1.03)
TOTAL CK: 243 U/L (ref 31–336)
TOTAL PROTEIN: 7.3 G/DL (ref 6.1–8.3)
URIC ACID: 4.1 MG/DL (ref 3.7–8)
UROBILINOGEN, URINE: 0.2 EU/DL (ref 0–1)
WBC: 7.4 K/UL (ref 3.5–11)

## 2023-06-28 ENCOUNTER — TELEPHONE (OUTPATIENT)
Dept: FAMILY MEDICINE CLINIC | Age: 33
End: 2023-06-28

## 2023-06-28 LAB
REASON FOR REJECTION: NORMAL
REJECTED TEST: NORMAL

## 2023-06-29 LAB — CCP IGG ANTIBODIES: <0.5 U/ML

## 2023-07-02 LAB
ANA PATTERN: NORMAL
ANTI-NUCLEAR ANTIBODY (ANA): NEGATIVE
CENTROMERE PATTERN: NEGATIVE TITER
COARSE SPECKLED PATTERN: NEGATIVE TITER
COMMENTS: NORMAL
CYTO RETICULAR (MITO) PATTERN: NEGATIVE
DENSE FINE SPECKLED PATTERN: NEGATIVE TITER
DISCRETE NUCLEAR DOTS PATTERN: NEGATIVE TITER
DSDNA ANTIBODY: <1 IU/ML
ENA TO SMITH (SM) ANTIBODY IGG: <0.2 AI
HOMOGENEOUS PATTERN: NEGATIVE TITER
METHOD: NORMAL
NUCLEAR MEMBRANE PATTERN: NEGATIVE TITER
NUCLEOLAR PATTERN: NEGATIVE TITER
RNP AB, IGG: 0.3 AI
SCLERODERMA ANTIBODY, IGG: <0.2 AI
SPECKLED PATTERN: NEGATIVE TITER

## 2023-07-05 ENCOUNTER — TELEPHONE (OUTPATIENT)
Dept: FAMILY MEDICINE CLINIC | Age: 33
End: 2023-07-05

## 2023-07-05 NOTE — TELEPHONE ENCOUNTER
----- Message from Chales Siemens, APRN - CNP sent at 7/5/2023  7:33 AM EDT -----  Rheumatology labs normal

## 2023-07-19 ENCOUNTER — HOSPITAL ENCOUNTER (OUTPATIENT)
Dept: NON INVASIVE DIAGNOSTICS | Age: 33
Discharge: HOME | End: 2023-07-19
Payer: MEDICAID

## 2023-07-19 DIAGNOSIS — F11.11 HX OF OPIOID ABUSE (HCC): ICD-10-CM

## 2023-07-19 LAB
LV EF: 55 %
LVEF MODALITY: NORMAL

## 2023-07-19 PROCEDURE — 93306 TTE W/DOPPLER COMPLETE: CPT

## 2023-07-21 ENCOUNTER — TELEPHONE (OUTPATIENT)
Dept: FAMILY MEDICINE CLINIC | Age: 33
End: 2023-07-21

## 2023-07-21 NOTE — TELEPHONE ENCOUNTER
[facility]  [address] [place] [address]  Phone:  [phone]  Fax:  [phone]                                    Breanna Vidal   MRN: [id number]    Department:  [facility]   Date of Visit:  8/9/2019           After Visit Summary Signature Page    I have received my discharge instructions, and my questions have been answered. I have discussed any challenges I see with this plan with the nurse or doctor.    ..........................................................................................................................................  Patient/Patient Representative Signature      ..........................................................................................................................................  Patient Representative Print Name and Relationship to Patient    ..................................................               ................................................  Date                                   Time    ..........................................................................................................................................  Reviewed by Signature/Title    ...................................................              ..............................................  Date                                               Time          22EPIC Rev 08/18        Patient informed and verbalized understanding.

## 2023-07-21 NOTE — TELEPHONE ENCOUNTER
----- Message from Lisabeth Claude, APRN - CNP sent at 7/20/2023  5:29 PM EDT -----  Echo was stable

## 2023-08-23 ENCOUNTER — OFFICE VISIT (OUTPATIENT)
Dept: FAMILY MEDICINE CLINIC | Age: 33
End: 2023-08-23
Payer: MEDICAID

## 2023-08-23 VITALS
BODY MASS INDEX: 21.48 KG/M2 | TEMPERATURE: 97.7 F | HEART RATE: 76 BPM | OXYGEN SATURATION: 98 % | RESPIRATION RATE: 18 BRPM | SYSTOLIC BLOOD PRESSURE: 126 MMHG | HEIGHT: 63 IN | DIASTOLIC BLOOD PRESSURE: 80 MMHG | WEIGHT: 121.2 LBS

## 2023-08-23 DIAGNOSIS — F41.9 ANXIETY DISORDER, UNSPECIFIED TYPE: ICD-10-CM

## 2023-08-23 DIAGNOSIS — Z23 ENCOUNTER FOR VACCINATION: Primary | ICD-10-CM

## 2023-08-23 PROCEDURE — 99214 OFFICE O/P EST MOD 30 MIN: CPT | Performed by: NURSE PRACTITIONER

## 2023-08-23 PROCEDURE — 90715 TDAP VACCINE 7 YRS/> IM: CPT | Performed by: NURSE PRACTITIONER

## 2023-08-23 PROCEDURE — G8420 CALC BMI NORM PARAMETERS: HCPCS | Performed by: NURSE PRACTITIONER

## 2023-08-23 PROCEDURE — G8427 DOCREV CUR MEDS BY ELIG CLIN: HCPCS | Performed by: NURSE PRACTITIONER

## 2023-08-23 PROCEDURE — 4004F PT TOBACCO SCREEN RCVD TLK: CPT | Performed by: NURSE PRACTITIONER

## 2023-08-23 PROCEDURE — 90471 IMMUNIZATION ADMIN: CPT | Performed by: NURSE PRACTITIONER

## 2023-08-23 RX ORDER — MIRTAZAPINE 30 MG/1
30 TABLET, FILM COATED ORAL NIGHTLY
Qty: 30 TABLET | Refills: 3 | Status: SHIPPED | OUTPATIENT
Start: 2023-08-23

## 2023-08-23 RX ORDER — HYDROXYZINE PAMOATE 25 MG/1
75 CAPSULE ORAL 3 TIMES DAILY PRN
Qty: 270 CAPSULE | Refills: 1 | Status: SHIPPED | OUTPATIENT
Start: 2023-08-23 | End: 2023-10-22

## 2023-08-23 SDOH — ECONOMIC STABILITY: FOOD INSECURITY: WITHIN THE PAST 12 MONTHS, THE FOOD YOU BOUGHT JUST DIDN'T LAST AND YOU DIDN'T HAVE MONEY TO GET MORE.: NEVER TRUE

## 2023-08-23 SDOH — ECONOMIC STABILITY: HOUSING INSECURITY
IN THE LAST 12 MONTHS, WAS THERE A TIME WHEN YOU DID NOT HAVE A STEADY PLACE TO SLEEP OR SLEPT IN A SHELTER (INCLUDING NOW)?: NO

## 2023-08-23 SDOH — ECONOMIC STABILITY: FOOD INSECURITY: WITHIN THE PAST 12 MONTHS, YOU WORRIED THAT YOUR FOOD WOULD RUN OUT BEFORE YOU GOT MONEY TO BUY MORE.: NEVER TRUE

## 2023-08-23 SDOH — ECONOMIC STABILITY: INCOME INSECURITY: HOW HARD IS IT FOR YOU TO PAY FOR THE VERY BASICS LIKE FOOD, HOUSING, MEDICAL CARE, AND HEATING?: NOT HARD AT ALL

## 2023-08-23 NOTE — PROGRESS NOTES
General: Skin is warm and dry. Neurological:      General: No focal deficit present. Mental Status: He is alert and oriented to person, place, and time. Psychiatric:         Mood and Affect: Mood normal.         Behavior: Behavior normal.         ASSESSMENT & PLAN  Chico was seen today for follow-up. Diagnoses and all orders for this visit:    Encounter for vaccination  -     Tdap, [facility], (age 8 yrs+), IM    Anxiety disorder, unspecified type  -     mirtazapine (REMERON) 30 MG tablet; Take 1 tablet by mouth nightly at bedtime.  -     hydrOXYzine pamoate (VISTARIL) 25 MG capsule; Take 3 capsules by mouth 3 times daily as needed for Anxiety    Call Dr. Weeks Back for appt, referral previously placed, no showed appt    Return in about 3 months (around 11/23/2023). Start above treatments    All copied or forwarded information in the progress note was verified by me to be accurate at the time of visit  Patient's past medical, surgical, social and family history were reviewed and updated     All patient questions answered. Patient voiced understanding.

## 2023-08-31 ENCOUNTER — TELEPHONE (OUTPATIENT)
Dept: FAMILY MEDICINE CLINIC | Age: 33
End: 2023-08-31

## 2023-08-31 NOTE — TELEPHONE ENCOUNTER
Pt called stating he would like a referral to [facility] for his umbilical hernia. This was discussed at pt's appointment on 5/2/23.     Please advise

## 2023-09-01 NOTE — TELEPHONE ENCOUNTER
Referral placed in May, was scheduled with Francisco and no showed  Looks like under referrals it is still open  Needs to call and get rescheduled.

## 2023-10-04 ENCOUNTER — OFFICE VISIT (OUTPATIENT)
Dept: INTERNAL MEDICINE CLINIC | Age: 33
End: 2023-10-04
Payer: MEDICAID

## 2023-10-04 VITALS
SYSTOLIC BLOOD PRESSURE: 110 MMHG | HEIGHT: 63 IN | HEART RATE: 88 BPM | TEMPERATURE: 99.6 F | WEIGHT: 127.6 LBS | BODY MASS INDEX: 22.61 KG/M2 | DIASTOLIC BLOOD PRESSURE: 74 MMHG

## 2023-10-04 DIAGNOSIS — B18.2 CHRONIC HEPATITIS C WITHOUT HEPATIC COMA (HCC): ICD-10-CM

## 2023-10-04 DIAGNOSIS — F19.10 POLYSUBSTANCE ABUSE (HCC): ICD-10-CM

## 2023-10-04 DIAGNOSIS — Z72.0 TOBACCO ABUSE: ICD-10-CM

## 2023-10-04 DIAGNOSIS — F12.10 MARIJUANA ABUSE: ICD-10-CM

## 2023-10-04 DIAGNOSIS — R63.4 ABNORMAL WEIGHT LOSS: Primary | ICD-10-CM

## 2023-10-04 PROCEDURE — G8420 CALC BMI NORM PARAMETERS: HCPCS

## 2023-10-04 PROCEDURE — G8484 FLU IMMUNIZE NO ADMIN: HCPCS

## 2023-10-04 PROCEDURE — 4004F PT TOBACCO SCREEN RCVD TLK: CPT

## 2023-10-04 PROCEDURE — G8427 DOCREV CUR MEDS BY ELIG CLIN: HCPCS

## 2023-10-04 PROCEDURE — 99202 OFFICE O/P NEW SF 15 MIN: CPT

## 2023-10-04 RX ORDER — GABAPENTIN 300 MG/1
300 CAPSULE ORAL 3 TIMES DAILY
COMMUNITY

## 2023-10-04 NOTE — PROGRESS NOTES
C    ROS:  Constitutional: (-) fever/chills, (+) recent loss of weight, (-) appetite changes, (-) night sweats. Head: (-) headache, (-) dizziness. Eye: (-) blurring of vision, (-) double vision, (-) redness. Ear: (-) hearing loss, (-) discharge, (-) vertigo  Nose: (-) discharge, (-) bleeding, (-) congestion, (-) post nasal drip. Throat: (-) sore throat, (-) hoarseness of voice, (-) odynophagia. Cardiovascular: (-) chest pain, (-) palpitations, (-) syncope, (-) orthopnea, (-) PND, (-) leg swelling. Respiratory: (-) shortness of breath, (-) cough, (-) wheezing, (-) hemoptysis. Neuro: (-) weakness in extremities, (-) numbness, (-) tingling, (-) tremor. Gastrointestinal: (-) belly pain, (-) belly distension, (-) nausea, (-) vomiting, (-) diarrhea, (-) constipation, (-) na, (-) hematemesis, (-) hematochezia, (-) bowel incontinence  Genitourinary: (-) hematuria, (-) dysuria, (-) polyuria, (-) hesitancy, (-) frequency, (-) urinary incontinence. Musculoskeletal: (-) myalgia, (-) arthralgia. Skin: (-) rashes. Endocrine: (-) heat/cold intolerance. Psychiatry: (-) depression, (-) hallucination. Current Medications:  Outpatient Medications Marked as Taking for the 10/4/23 encounter (Office Visit) with Francoise Hayes, DO   Medication Sig Dispense Refill    gabapentin (NEURONTIN) 300 MG capsule Take 1 capsule by mouth 3 times daily. mirtazapine (REMERON) 30 MG tablet Take 1 tablet by mouth nightly at bedtime. 30 tablet 3    hydrOXYzine pamoate (VISTARIL) 25 MG capsule Take 3 capsules by mouth 3 times daily as needed for Anxiety 270 capsule 1    pantoprazole (PROTONIX) 40 MG tablet Take 1 tablet by mouth daily 90 tablet 3    nicotine (NICOTROL) 10 MG inhaler Inhale 2 puffs into the lungs as needed for Smoking cessation 18 each 3    naloxone 4 MG/0.1ML LIQD nasal spray ADMINISTER A SINGLE SPRAY IN ONE NOSTRIL UPON SIGNS OF OPIOID OVERDOSE. CALL 911.  REPEAT AFTER 3 MINUTES IF NO RESPONSE.      famotidine

## 2023-10-17 ENCOUNTER — OFFICE VISIT (OUTPATIENT)
Dept: INTERNAL MEDICINE CLINIC | Age: 33
End: 2023-10-17
Payer: MEDICAID

## 2023-10-17 VITALS — WEIGHT: 126 LBS | HEIGHT: 63 IN | BODY MASS INDEX: 22.32 KG/M2

## 2023-10-17 DIAGNOSIS — R63.4 UNINTENTIONAL WEIGHT LOSS: Primary | ICD-10-CM

## 2023-10-17 DIAGNOSIS — Z71.3 DIETARY COUNSELING AND SURVEILLANCE: ICD-10-CM

## 2023-10-17 PROCEDURE — 99211 OFF/OP EST MAY X REQ PHY/QHP: CPT | Performed by: DIETITIAN, REGISTERED

## 2023-10-17 NOTE — PROGRESS NOTES
potassium needs better and prevent muscle cramping, drinking nutrition supplement drink daily or other high calorie/protein shake or smoothie daily.    -Handouts given for: strategies to gain weight, high calorie high protein nutrition therapy guidelines, healthy snacks for added calories, food logging, smoothie recipes. Patient Instructions   Eat 3 meals and 3 snacks/day    Make a grocery list of items needed for added protein and calories to foods. Good that you are making your drinks work for you - whole milk, 100% fruit juice. - Good idea to see if you can have the occasional caffeine free Body [place]. Add extra calories to foods - Example:  oil/butter/jam/honey    Include high calorie and high protein shake/smoothie OR nutritional supplement drink.  - Try [place] instant Brkf drink mix into your milk. Bring a 2 week food log to next dietitian appt. -Nutrition prescription: 2000 - 2300 calories (35-40 kcal/kg act wt 57 kg)/day, 85 - 114 gm protein/day. Comprehension verified using teachback method. Monitoring/Evaluation:   -Followup visit: 6 weeks with dietitian.   -Receptiveness to education/goals: Agreeable.  -Evaluation of education: Indicates understanding.  -Readiness to change: contemplation - ambivalent about change making smoothie or shake each day, adding extra calories to his foods.  -Expected compliance: good. Thank you for your referral of this patient. Total time involved in direct patient education: 30 minutes for initial MNT visit.

## 2023-10-17 NOTE — PATIENT INSTRUCTIONS
Eat 3 meals and 3 snacks/day    Make a grocery list of items needed for added protein and calories to foods. Good that you are making your drinks work for you - whole milk, 100% fruit juice. - Good idea to see if you can have the occasional caffeine free Body [place]. Add extra calories to foods - Example:  oil/butter/jam/honey    Include high calorie and high protein shake/smoothie OR nutritional supplement drink.  - Try [place] instant Brkf drink mix into your milk. Bring a 2 week food log to next dietitian appt.

## 2023-11-20 DIAGNOSIS — B19.20 HEPATITIS C TEST POSITIVE: ICD-10-CM

## 2023-11-20 DIAGNOSIS — R53.83 FATIGUE, UNSPECIFIED TYPE: ICD-10-CM

## 2023-11-20 DIAGNOSIS — M25.50 ARTHRALGIA, UNSPECIFIED JOINT: ICD-10-CM

## 2023-11-20 DIAGNOSIS — F11.20 CONTINUOUS OPIOID DEPENDENCE (HCC): ICD-10-CM

## 2023-11-21 RX ORDER — GABAPENTIN 300 MG/1
300 CAPSULE ORAL 3 TIMES DAILY
Qty: 270 CAPSULE | Refills: 1 | Status: SHIPPED | OUTPATIENT
Start: 2023-11-21 | End: 2024-05-19

## 2023-11-29 ENCOUNTER — OFFICE VISIT (OUTPATIENT)
Dept: FAMILY MEDICINE CLINIC | Age: 33
End: 2023-11-29
Payer: MEDICAID

## 2023-11-29 VITALS
SYSTOLIC BLOOD PRESSURE: 118 MMHG | BODY MASS INDEX: 20.25 KG/M2 | HEIGHT: 66 IN | HEART RATE: 84 BPM | TEMPERATURE: 97.9 F | RESPIRATION RATE: 12 BRPM | OXYGEN SATURATION: 97 % | DIASTOLIC BLOOD PRESSURE: 78 MMHG | WEIGHT: 126 LBS

## 2023-11-29 DIAGNOSIS — R53.83 OTHER FATIGUE: ICD-10-CM

## 2023-11-29 DIAGNOSIS — K21.9 GASTROESOPHAGEAL REFLUX DISEASE, UNSPECIFIED WHETHER ESOPHAGITIS PRESENT: ICD-10-CM

## 2023-11-29 DIAGNOSIS — R11.0 NAUSEA: Primary | ICD-10-CM

## 2023-11-29 DIAGNOSIS — F41.9 ANXIETY DISORDER, UNSPECIFIED TYPE: ICD-10-CM

## 2023-11-29 DIAGNOSIS — K42.9 UMBILICAL HERNIA WITHOUT OBSTRUCTION AND WITHOUT GANGRENE: ICD-10-CM

## 2023-11-29 DIAGNOSIS — R06.83 SNORING: ICD-10-CM

## 2023-11-29 PROCEDURE — G8427 DOCREV CUR MEDS BY ELIG CLIN: HCPCS | Performed by: NURSE PRACTITIONER

## 2023-11-29 PROCEDURE — G8484 FLU IMMUNIZE NO ADMIN: HCPCS | Performed by: NURSE PRACTITIONER

## 2023-11-29 PROCEDURE — 99214 OFFICE O/P EST MOD 30 MIN: CPT | Performed by: NURSE PRACTITIONER

## 2023-11-29 PROCEDURE — G8420 CALC BMI NORM PARAMETERS: HCPCS | Performed by: NURSE PRACTITIONER

## 2023-11-29 PROCEDURE — 4004F PT TOBACCO SCREEN RCVD TLK: CPT | Performed by: NURSE PRACTITIONER

## 2023-11-29 RX ORDER — FAMOTIDINE 20 MG/1
20 TABLET, FILM COATED ORAL 2 TIMES DAILY PRN
Qty: 180 TABLET | Refills: 3 | Status: SHIPPED | OUTPATIENT
Start: 2023-11-29

## 2023-11-29 RX ORDER — MIRTAZAPINE 30 MG/1
30 TABLET, FILM COATED ORAL NIGHTLY
Qty: 90 TABLET | Refills: 3 | Status: SHIPPED | OUTPATIENT
Start: 2023-11-29

## 2023-11-29 RX ORDER — PANTOPRAZOLE SODIUM 40 MG/1
40 TABLET, DELAYED RELEASE ORAL DAILY
Qty: 90 TABLET | Refills: 3 | Status: SHIPPED | OUTPATIENT
Start: 2023-11-29

## 2023-11-29 RX ORDER — ONDANSETRON 4 MG/1
4 TABLET, FILM COATED ORAL EVERY 8 HOURS PRN
Qty: 30 TABLET | Refills: 3 | Status: SHIPPED | OUTPATIENT
Start: 2023-11-29

## 2023-11-29 NOTE — PROGRESS NOTES
Patient requesting the following   Please approve or refuse Rx request:  Requested Prescriptions     Pending Prescriptions Disp Refills    famotidine (PEPCID) 20 MG tablet 180 tablet 1     Sig: Take 1 tablet by mouth 2 times daily as needed (acid reflux)    ondansetron (ZOFRAN) 4 MG tablet 15 tablet 0     Si tablet every 6-8 hours for nausea vomiting       Next appointment:  Visit date not found

## 2023-12-01 ENCOUNTER — TELEPHONE (OUTPATIENT)
Dept: SURGERY | Age: 33
End: 2023-12-01

## 2023-12-01 NOTE — TELEPHONE ENCOUNTER
Referral from Christ Fowler CNP to Dr. Keshia Kunz - Umbilical hernia    Called pt, no answer, LM asking him to return call to schedule appointment

## 2024-01-10 NOTE — TELEPHONE ENCOUNTER
I advised pt and he expressed understanding and had no questions at this time.
Sent rx in for Gabapentin  Won't be filled until next week when he should be out
Left arm;

## 2024-02-19 ENCOUNTER — OFFICE VISIT (OUTPATIENT)
Dept: SURGERY | Age: 34
End: 2024-02-19
Payer: MEDICAID

## 2024-02-19 VITALS
TEMPERATURE: 97.3 F | RESPIRATION RATE: 18 BRPM | BODY MASS INDEX: 19.41 KG/M2 | HEART RATE: 108 BPM | WEIGHT: 120.8 LBS | DIASTOLIC BLOOD PRESSURE: 88 MMHG | SYSTOLIC BLOOD PRESSURE: 138 MMHG | OXYGEN SATURATION: 97 % | HEIGHT: 66 IN

## 2024-02-19 DIAGNOSIS — K42.0 INCARCERATED UMBILICAL HERNIA: ICD-10-CM

## 2024-02-19 DIAGNOSIS — K21.9 GASTROESOPHAGEAL REFLUX DISEASE, UNSPECIFIED WHETHER ESOPHAGITIS PRESENT: Primary | ICD-10-CM

## 2024-02-19 PROCEDURE — G8420 CALC BMI NORM PARAMETERS: HCPCS | Performed by: SURGERY

## 2024-02-19 PROCEDURE — 4004F PT TOBACCO SCREEN RCVD TLK: CPT | Performed by: SURGERY

## 2024-02-19 PROCEDURE — G8427 DOCREV CUR MEDS BY ELIG CLIN: HCPCS | Performed by: SURGERY

## 2024-02-19 PROCEDURE — G8484 FLU IMMUNIZE NO ADMIN: HCPCS | Performed by: SURGERY

## 2024-02-19 PROCEDURE — 99203 OFFICE O/P NEW LOW 30 MIN: CPT | Performed by: SURGERY

## 2024-02-20 ASSESSMENT — ENCOUNTER SYMPTOMS
VOICE CHANGE: 0
SINUS PRESSURE: 0
STRIDOR: 0
RECTAL PAIN: 0
ABDOMINAL PAIN: 1
PHOTOPHOBIA: 0
ALLERGIC/IMMUNOLOGIC NEGATIVE: 1
EYE REDNESS: 0
VOMITING: 1
COLOR CHANGE: 0
WHEEZING: 0
BLOOD IN STOOL: 0
DIARRHEA: 1
RHINORRHEA: 1
CHEST TIGHTNESS: 1
EYE PAIN: 0
CHOKING: 0
NAUSEA: 1
CONSTIPATION: 1
TROUBLE SWALLOWING: 0
SHORTNESS OF BREATH: 1
EYE ITCHING: 0
FACIAL SWELLING: 0
EYE DISCHARGE: 0
SORE THROAT: 0
COUGH: 0
ANAL BLEEDING: 1
APNEA: 0
ABDOMINAL DISTENTION: 1
BACK PAIN: 1

## 2024-02-20 NOTE — PROGRESS NOTES
Subjective:      Patient ID: Chico Sanchez is a 33 y.o. male.    HPI    Review of Systems  Chief Complaint   Patient presents with    Surgical Consult     NP refer Nubia Osborne CNP-Umbilical hernia       Objective:   Physical Exam  /88 (Site: Left Upper Arm, Position: Sitting, Cuff Size: Medium Adult)   Pulse (!) 108   Temp 97.3 °F (36.3 °C) (Temporal)   Resp 18   Ht 1.676 m (5' 6\")   Wt 54.8 kg (120 lb 12.8 oz)   SpO2 97%   BMI 19.50 kg/m²     Assessment:            Plan:              Jose Victor RN  
alert and oriented to person, place, and time.      Cranial Nerves: No cranial nerve deficit.   Psychiatric:         Behavior: Behavior normal.         Thought Content: Thought content normal.         Judgment: Judgment normal.       Lab Results   Component Value Date    WBC 7.4 06/27/2023    HGB 14.7 06/27/2023    HCT 43.0 06/27/2023    MCV 86.7 06/27/2023     06/27/2023     Lab Results   Component Value Date     06/27/2023    K 4.1 06/27/2023     06/27/2023    CO2 25 06/27/2023     Lab Results   Component Value Date    CREATININE 0.92 06/27/2023     Lab Results   Component Value Date    ALT 16 06/27/2023    AST 22 06/27/2023    ALKPHOS 78 06/27/2023    BILITOT 0.7 06/27/2023     Lab Results   Component Value Date    LIPASE 22.8 12/13/2021       Patient Active Problem List   Diagnosis    Continuous opioid dependence (HCC)    Anxiety disorder     CT ABDOMEN PELVIS W IV CONTRAST Additional Contrast? None  Order: [id number]  Status: Final result       Visible to patient: Yes (not seen)       Next appt: Today at 10:30 AM in Radiology ([facility])    0 Result Notes  Details    Reading Physician Reading Date Result Priority   Damon Landry MD  [phone] 12/5/2022      Narrative & Impression  CT of the abdomen and pelvis after administration of IV contrast.     Technique: CT from the lung bases through the ischial tuberosities after   administration of IV contrast     Comparison:  CT/KO/SR - CT ABDOMEN PELVIS W IV CONTRAST - 12/13/2021 04:20 PM EST     Findings: Prominent nodular consolidation bilateral lung bases. This is   likely infectious.  Normal liver. No masses.  The gallbladder is unremarkable by CT.  Normal appearing adrenal glands.  Normal spleen. No masses. The spleen appears normal in size.  Normal kidneys. No renal mass. No hydronephrosis.  Normal bowel. No pneumoperitoneum or abscess. No bowel obstruction.  Normal pancreas. No pancreatitis.  Normal retroperitoneal structures. No

## 2024-02-21 ENCOUNTER — HOSPITAL ENCOUNTER (OUTPATIENT)
Dept: GENERAL RADIOLOGY | Age: 34
Discharge: HOME | End: 2024-02-21
Attending: SURGERY
Payer: MEDICAID

## 2024-02-21 ENCOUNTER — TELEPHONE (OUTPATIENT)
Dept: SURGERY | Age: 34
End: 2024-02-21

## 2024-02-21 DIAGNOSIS — K21.9 GASTROESOPHAGEAL REFLUX DISEASE, UNSPECIFIED WHETHER ESOPHAGITIS PRESENT: ICD-10-CM

## 2024-02-21 DIAGNOSIS — R63.4 WEIGHT LOSS: ICD-10-CM

## 2024-02-21 DIAGNOSIS — K42.0 INCARCERATED UMBILICAL HERNIA: Primary | ICD-10-CM

## 2024-02-21 PROCEDURE — 74220 X-RAY XM ESOPHAGUS 1CNTRST: CPT

## 2024-02-21 PROCEDURE — 2500000003 HC RX 250 WO HCPCS: Performed by: SURGERY

## 2024-02-21 PROCEDURE — 6370000000 HC RX 637 (ALT 250 FOR IP): Performed by: SURGERY

## 2024-02-21 RX ADMIN — BARIUM SULFATE 140 ML: 980 POWDER, FOR SUSPENSION ORAL at 10:56

## 2024-02-21 RX ADMIN — ANTACID/ANTIFLATULENT 1 EACH: 380; 550; 10; 10 GRANULE, EFFERVESCENT ORAL at 10:56

## 2024-02-21 RX ADMIN — BARIUM SULFATE 100 ML: 0.6 SUSPENSION ORAL at 10:57

## 2024-02-21 NOTE — TELEPHONE ENCOUNTER
Attempts to contact pt to let him know Dr. Singh would like CT abd/pelvis done.  No answer, phone rings busy.  Attempted to contact emergency contact mother Neetu who is on HIPAA, no answer, voicemail full

## 2024-02-23 ENCOUNTER — PREP FOR PROCEDURE (OUTPATIENT)
Dept: SURGERY | Age: 34
End: 2024-02-23

## 2024-02-23 RX ORDER — SODIUM CHLORIDE 0.9 % (FLUSH) 0.9 %
5-40 SYRINGE (ML) INJECTION EVERY 12 HOURS SCHEDULED
Status: CANCELLED | OUTPATIENT
Start: 2024-02-23

## 2024-02-23 RX ORDER — SODIUM CHLORIDE 450 MG/100ML
INJECTION, SOLUTION INTRAVENOUS CONTINUOUS
Status: CANCELLED | OUTPATIENT
Start: 2024-02-23

## 2024-02-23 RX ORDER — SODIUM CHLORIDE 9 MG/ML
INJECTION, SOLUTION INTRAVENOUS PRN
Status: CANCELLED | OUTPATIENT
Start: 2024-02-23

## 2024-02-23 RX ORDER — SODIUM CHLORIDE 0.9 % (FLUSH) 0.9 %
5-40 SYRINGE (ML) INJECTION PRN
Status: CANCELLED | OUTPATIENT
Start: 2024-02-23

## 2024-02-29 ENCOUNTER — TELEPHONE (OUTPATIENT)
Dept: SURGERY | Age: 34
End: 2024-02-29

## 2024-02-29 NOTE — TELEPHONE ENCOUNTER
Late entry 2/28/24:    Pt called in stating he forgot and took antacids. BRAVO needs rescheduled as he was supposed to hold these.    BRAVO rescheduled to 3/11, arrive at 9:30 am 2nd floor registration desk.   Also informed pt Dr. Singh wants him to get CT abd/pelvis prior to follow up appointment.  CT scheduled 3/20/24, arrive at 1:30 [facility], NPO 4 hrs.   F/U scheduled with Dr. Singh 4/3 at 1:30 pm.  Pt voiced understanding

## 2024-03-04 RX ORDER — SODIUM CHLORIDE 450 MG/100ML
INJECTION, SOLUTION INTRAVENOUS CONTINUOUS
Status: DISCONTINUED | OUTPATIENT
Start: 2024-03-04 | End: 2024-03-11 | Stop reason: HOSPADM

## 2024-03-04 RX ORDER — SODIUM CHLORIDE 0.9 % (FLUSH) 0.9 %
5-40 SYRINGE (ML) INJECTION PRN
Status: DISCONTINUED | OUTPATIENT
Start: 2024-03-04 | End: 2024-03-11 | Stop reason: HOSPADM

## 2024-03-04 RX ORDER — SODIUM CHLORIDE 9 MG/ML
INJECTION, SOLUTION INTRAVENOUS PRN
Status: DISCONTINUED | OUTPATIENT
Start: 2024-03-04 | End: 2024-03-11 | Stop reason: HOSPADM

## 2024-03-04 RX ORDER — SODIUM CHLORIDE 0.9 % (FLUSH) 0.9 %
5-40 SYRINGE (ML) INJECTION EVERY 12 HOURS SCHEDULED
Status: DISCONTINUED | OUTPATIENT
Start: 2024-03-04 | End: 2024-03-11 | Stop reason: HOSPADM

## 2024-03-08 NOTE — DISCHARGE INSTRUCTIONS
ENDOSCOPY DISCHARGE INSTRUCTION SHEET  EGD/COLONOSCOPY  ROBBIE MURPHY  3/8/2024    Call your doctor at [phone] if any of the following symptoms occur at anytime   *Excessive pain - a few cramps are to be expected   *Temperature above 100 degrees    *Excessive bleeding or large clots of blood. Don't worry about a few specks of blood.   *Repeated nausea and vomiting.    2.    Follow with Dr. Singh as scheduled      [address] [facility] [address]  [place] [address]  Electronically signed by Ismael Penny DO on 3/8/2024 at 1:11 PM

## 2024-03-08 NOTE — OP NOTE
[facility]  RECORD OF OPERATION  PATIENT NAME: Chico Sanchez  MEDICAL RECORD NO. [id number]  DATE: 3/11/2024  SURGEON: ROBBIE Ojeda  PRIMARY CARE PHSYICIAN: Nubia Osborne APRN - CNP     PROCEDURE PERFORMED:  3/11/2024  PREOPERATIVE DIAGNOSES: Pre-Op Diagnosis Codes:     * Gastroesophageal reflux disease, unspecified whether esophagitis present [K21.9]   POSTOPERATIVE DIAGNOSIS: sliding hiatal hernia, reflux esophagitis  PROCEDURE PERFORMED:  EGD with biopsy and BRAVO placement  SURGEON:  Dr. Ismael Penny.  ANESTHESIA:   IV sedation per anesthesia  ESTIMATED BLOOD LOSS:  None.  SPECIMENS:   G-E biopsy sent to pathology for analysis  COMPLICATIONS:  None immediately appreciated.     DISCUSSION:  Chico is a 33 y.o.-year-old male who presented to the office with symptoms of GERD at the request of Nubia Osborne APRN - CNP. After a history and physical examination was performed, potential diagnostic and therapeutic modalities were discussed with the patient.  Radiographic and endoscopic studies were discussed. The risks, complications and benefits were reviewed. He was given an opportunity to ask questions and once answered informed consent was obtained. He was brought to the Endoscopy Suite on 3/11/2024 for the procedure.     OPERATIVE FINDINGS:  At the time of endoscopy the esophageal contours within normal limits. Sliding hiatal hernia with about 2 cm of stomach in the chest.  Pt had  grade 3 reflux esophagitis, gastric contours within normal limits.  There is no evidence of bleeding or ulcerations present within the stomach, pylorus is widely patent and no evidence of duodenal ulcerations or bleeding were evident.     PROCEDURE:  The patient was brought to the Endoscopy Suite, placed in left lateral Shell position, placed under cardiac, telemetry, blood pressure and pulse oximetry monitoring, placed under IV sedation with the medications noted above done in

## 2024-03-08 NOTE — H&P
DO SHIRA Fan DR GENERAL SURGERY  PRE SEDATION HISTORY AND PHYSICAL      Pt Name: Chico Sanchez  MRN: [id number]  YOB: 1990  Provider Performing Procedure: DO ROBBIE Fan  Primary Care Physician: Nubia Osborne APRN - CNP  PRE-PROCEDURE   DNR-CCA/DNR-CC - No  Brief History/Pre-Procedure Diagnosis  Pre-Op Diagnosis Codes:     * Gastroesophageal reflux disease, unspecified whether esophagitis present [K21.9]   MEDICAL HISTORY       has a past medical history of Anxiety, Depression, GERD (gastroesophageal reflux disease), and Hepatitis C.  SURGICAL HISTORY   has a past surgical history that includes Colonoscopy (10/24/2022).  ALLERGIES   Allergies as of 02/19/2024    (No Known Allergies)     MEDICATIONS   Coumadin Use Last 7 Days: No  Antiplatelet drug therapy use last 7 days: No  Other anticoagulant use last 7 days: No  Prior to Admission medications    Medication Sig Start Date End Date Taking? Authorizing Provider   famotidine (PEPCID) 20 MG tablet Take 1 tablet by mouth 2 times daily as needed (acid reflux) 11/29/23   Nubia Osborne APRN - CNP   ondansetron (ZOFRAN) 4 MG tablet Take 1 tablet by mouth every 8 hours as needed for Nausea or Vomiting 11/29/23   Nubia Osborne APRN - CNP   pantoprazole (PROTONIX) 40 MG tablet Take 1 tablet by mouth daily 11/29/23   Nubia Osborne APRN - CNP   mirtazapine (REMERON) 30 MG tablet Take 1 tablet by mouth nightly at bedtime. 11/29/23   Nubia Osborne APRN - CNP   gabapentin (NEURONTIN) 300 MG capsule Take 1 capsule by mouth 3 times daily for 180 days. 11/21/23 5/19/24  Nubia Osborne APRN - CNP   nicotine (NICOTROL) 10 MG inhaler Inhale 2 puffs into the lungs as needed for Smoking cessation  Patient not taking: Reported on 3/11/2024 5/2/23   Nubia Osborne APRN - CNP   nicotine (NICODERM CQ) 14 MG/24HR Place 1 patch onto the skin daily 5/2/23 7/1/23  Nubia Osborne APRN - CNP

## 2024-03-11 ENCOUNTER — HOSPITAL ENCOUNTER (OUTPATIENT)
Age: 34
Setting detail: OUTPATIENT SURGERY
Discharge: HOME | End: 2024-03-11
Attending: SURGERY | Admitting: SURGERY
Payer: MEDICAID

## 2024-03-11 ENCOUNTER — ANESTHESIA (OUTPATIENT)
Dept: ENDOSCOPY | Age: 34
End: 2024-03-11
Payer: MEDICAID

## 2024-03-11 ENCOUNTER — ANESTHESIA EVENT (OUTPATIENT)
Dept: ENDOSCOPY | Age: 34
End: 2024-03-11
Payer: MEDICAID

## 2024-03-11 VITALS
RESPIRATION RATE: 18 BRPM | TEMPERATURE: 97.6 F | HEART RATE: 95 BPM | HEIGHT: 63 IN | WEIGHT: 119.6 LBS | SYSTOLIC BLOOD PRESSURE: 128 MMHG | OXYGEN SATURATION: 98 % | BODY MASS INDEX: 21.19 KG/M2 | DIASTOLIC BLOOD PRESSURE: 88 MMHG

## 2024-03-11 PROCEDURE — 2580000003 HC RX 258: Performed by: SURGERY

## 2024-03-11 PROCEDURE — 6360000002 HC RX W HCPCS: Performed by: NURSE ANESTHETIST, CERTIFIED REGISTERED

## 2024-03-11 PROCEDURE — 2500000003 HC RX 250 WO HCPCS: Performed by: NURSE ANESTHETIST, CERTIFIED REGISTERED

## 2024-03-11 PROCEDURE — 43239 EGD BIOPSY SINGLE/MULTIPLE: CPT | Performed by: SURGERY

## 2024-03-11 PROCEDURE — 3700000001 HC ADD 15 MINUTES (ANESTHESIA): Performed by: SURGERY

## 2024-03-11 PROCEDURE — 7100000010 HC PHASE II RECOVERY - FIRST 15 MIN: Performed by: SURGERY

## 2024-03-11 PROCEDURE — 3604323500 HC GERD TEST W/ELECTRODE (BRAVO): Performed by: SURGERY

## 2024-03-11 PROCEDURE — 3700000000 HC ANESTHESIA ATTENDED CARE: Performed by: SURGERY

## 2024-03-11 PROCEDURE — 88305 TISSUE EXAM BY PATHOLOGIST: CPT

## 2024-03-11 RX ORDER — PROPOFOL 10 MG/ML
INJECTION, EMULSION INTRAVENOUS PRN
Status: DISCONTINUED | OUTPATIENT
Start: 2024-03-11 | End: 2024-03-11 | Stop reason: SDUPTHER

## 2024-03-11 RX ORDER — LIDOCAINE HYDROCHLORIDE 20 MG/ML
INJECTION, SOLUTION EPIDURAL; INFILTRATION; INTRACAUDAL; PERINEURAL PRN
Status: DISCONTINUED | OUTPATIENT
Start: 2024-03-11 | End: 2024-03-11 | Stop reason: SDUPTHER

## 2024-03-11 RX ADMIN — SODIUM CHLORIDE: 9 INJECTION, SOLUTION INTRAVENOUS at 10:14

## 2024-03-11 RX ADMIN — PROPOFOL 450 MG: 10 INJECTION, EMULSION INTRAVENOUS at 10:36

## 2024-03-11 RX ADMIN — LIDOCAINE HYDROCHLORIDE 100 MG: 20 INJECTION, SOLUTION EPIDURAL; INFILTRATION; INTRACAUDAL; PERINEURAL at 10:36

## 2024-03-11 ASSESSMENT — PAIN - FUNCTIONAL ASSESSMENT
PAIN_FUNCTIONAL_ASSESSMENT: NONE - DENIES PAIN

## 2024-03-11 ASSESSMENT — LIFESTYLE VARIABLES: SMOKING_STATUS: 1

## 2024-03-11 NOTE — PROGRESS NOTES
EGD complete, photos taken, 1 specimens taken by biopsy forceps at GE junction , pt tolerated procedure well  [employer] recorder Rukhsana 03 used.   GE junction at 38 cm   Bravo chip placed at 32 cm  LOT # [id number]  ID # [id number]    Scope Number  used.

## 2024-03-11 NOTE — ANESTHESIA PRE PROCEDURE
Department of Anesthesiology  Preprocedure Note       Name:  Chico Sanchez   Age:  33 y.o.  :  1990                                          MRN:  [id number]         Date:  3/11/2024      Surgeon: Surgeon(s):  Ismael Penny DO    Procedure: Procedure(s):  EGD Bravo    Medications prior to admission:   Prior to Admission medications    Medication Sig Start Date End Date Taking? Authorizing Provider   famotidine (PEPCID) 20 MG tablet Take 1 tablet by mouth 2 times daily as needed (acid reflux) 23   Nubia Osborne APRN - CNP   ondansetron (ZOFRAN) 4 MG tablet Take 1 tablet by mouth every 8 hours as needed for Nausea or Vomiting 23   Nubia Osborne APRN - CNP   pantoprazole (PROTONIX) 40 MG tablet Take 1 tablet by mouth daily 23   Nubia Osborne APRN - CNP   mirtazapine (REMERON) 30 MG tablet Take 1 tablet by mouth nightly at bedtime. 23   Nubia Osborne APRN - CNP   gabapentin (NEURONTIN) 300 MG capsule Take 1 capsule by mouth 3 times daily for 180 days. 23  Nubia Osborne APRN - CNP   nicotine (NICOTROL) 10 MG inhaler Inhale 2 puffs into the lungs as needed for Smoking cessation  Patient not taking: Reported on 3/11/2024 5/2/23   Nuiba Osborne APRN - CNP   nicotine (NICODERM CQ) 14 MG/24HR Place 1 patch onto the skin daily 23  Nubia Osborne APRN - CNP   naloxone 4 MG/0.1ML LIQD nasal spray ADMINISTER A SINGLE SPRAY IN ONE NOSTRIL UPON SIGNS OF OPIOID OVERDOSE. CALL 911. REPEAT AFTER 3 MINUTES IF NO RESPONSE. 1/10/23   ProviderJon MD   buprenorphine-naloxone (SUBOXONE) 8-2 MG FILM SL film Place 0.5 Film under the tongue daily.    Jon Gillespie MD       Current medications:    Current Facility-Administered Medications   Medication Dose Route Frequency Provider Last Rate Last Admin    0.45 % sodium chloride infusion   IntraVENous Continuous Ismael Penny DO        sodium chloride flush 0.9 %

## 2024-03-11 NOTE — PROGRESS NOTES
Pt in phase 2 of recovery. Denies pain, fully awake. Tolerating fluids, IV removed.   Discharge instructions given.

## 2024-03-11 NOTE — PROGRESS NOTES
Pt admitted into endo room 5. Consent signed and verified. BRAVO teaching complete. Loan agreement signed. ELIO 03 given to pt.

## 2024-03-11 NOTE — ANESTHESIA POSTPROCEDURE EVALUATION
Department of Anesthesiology  Postprocedure Note    Patient: Chico Sanchez  MRN: [id number]  YOB: 1990  Date of evaluation: 3/11/2024    Procedure Summary       Date: 03/11/24 Room / Location: [place] [address] / [facility]    Anesthesia Start: 1035 Anesthesia Stop: 1104    Procedure: EGD Bravo Diagnosis:       Gastroesophageal reflux disease, unspecified whether esophagitis present      (Gastroesophageal reflux disease, unspecified whether esophagitis present [K21.9])    Surgeons: Ismael Penny DO Responsible Provider: Mayco Al DO    Anesthesia Type: general, TIVA ASA Status: 2            Anesthesia Type: No value filed.    Mandeep Phase I: Mandeep Score: 10    Mandeep Phase II:      Anesthesia Post Evaluation    Patient location during evaluation: bedside  Patient participation: complete - patient participated  Level of consciousness: responsive to verbal stimuli  Airway patency: patent  Nausea & Vomiting: no nausea and no vomiting  Cardiovascular status: hemodynamically stable  Respiratory status: acceptable, spontaneous ventilation and room air  Hydration status: euvolemic  Pain management: adequate        No notable events documented.

## 2024-03-14 ENCOUNTER — TELEPHONE (OUTPATIENT)
Dept: SURGERY | Age: 34
End: 2024-03-14

## 2024-03-14 NOTE — TELEPHONE ENCOUNTER
Pt called and states he had EGD done on 3/11/24.  He is still having left sided pain when he swallows.  It is under his nipple area.  Is this normal?

## 2024-03-18 ENCOUNTER — TELEPHONE (OUTPATIENT)
Dept: SURGERY | Age: 34
End: 2024-03-18

## 2024-03-18 NOTE — TELEPHONE ENCOUNTER
Pt called office and LM on office VM stating he called last week and stated he was in pain. He called today stated he his not having the pain any more.  He is doing good.

## 2024-03-20 ENCOUNTER — HOSPITAL ENCOUNTER (OUTPATIENT)
Dept: CT IMAGING | Age: 34
Discharge: HOME | End: 2024-03-20
Attending: SURGERY
Payer: MEDICAID

## 2024-03-20 DIAGNOSIS — R63.4 WEIGHT LOSS: ICD-10-CM

## 2024-03-20 DIAGNOSIS — K42.0 INCARCERATED UMBILICAL HERNIA: ICD-10-CM

## 2024-03-20 PROCEDURE — 6360000004 HC RX CONTRAST MEDICATION: Performed by: SURGERY

## 2024-03-20 PROCEDURE — 74177 CT ABD & PELVIS W/CONTRAST: CPT

## 2024-03-20 RX ADMIN — IOPAMIDOL 80 ML: 755 INJECTION, SOLUTION INTRAVENOUS at 14:29

## 2024-04-02 ENCOUNTER — TELEPHONE (OUTPATIENT)
Dept: PULMONOLOGY | Age: 34
End: 2024-04-02

## 2024-04-02 NOTE — TELEPHONE ENCOUNTER
Patient did not show for his Sleep Consult appointment today, this was his third no show. Unfortunately we will no longer schedule him for a Sleep Consult. Patient was sent a certified letter.

## 2024-04-03 ENCOUNTER — OFFICE VISIT (OUTPATIENT)
Dept: SURGERY | Age: 34
End: 2024-04-03
Payer: MEDICAID

## 2024-04-03 VITALS
DIASTOLIC BLOOD PRESSURE: 68 MMHG | OXYGEN SATURATION: 98 % | RESPIRATION RATE: 18 BRPM | HEART RATE: 100 BPM | SYSTOLIC BLOOD PRESSURE: 126 MMHG | TEMPERATURE: 98.3 F

## 2024-04-03 DIAGNOSIS — K44.9 HIATAL HERNIA: ICD-10-CM

## 2024-04-03 DIAGNOSIS — K42.0 INCARCERATED UMBILICAL HERNIA: Primary | ICD-10-CM

## 2024-04-03 DIAGNOSIS — K21.00 GASTROESOPHAGEAL REFLUX DISEASE WITH ESOPHAGITIS WITHOUT HEMORRHAGE: ICD-10-CM

## 2024-04-03 PROCEDURE — G8427 DOCREV CUR MEDS BY ELIG CLIN: HCPCS | Performed by: SURGERY

## 2024-04-03 PROCEDURE — G8420 CALC BMI NORM PARAMETERS: HCPCS | Performed by: SURGERY

## 2024-04-03 PROCEDURE — 4004F PT TOBACCO SCREEN RCVD TLK: CPT | Performed by: SURGERY

## 2024-04-03 PROCEDURE — 99213 OFFICE O/P EST LOW 20 MIN: CPT | Performed by: SURGERY

## 2024-04-04 ASSESSMENT — ENCOUNTER SYMPTOMS
SHORTNESS OF BREATH: 0
EYE ITCHING: 0
VOMITING: 0
FACIAL SWELLING: 0
ALLERGIC/IMMUNOLOGIC NEGATIVE: 1
COLOR CHANGE: 0
APNEA: 0
EYE REDNESS: 0
ABDOMINAL PAIN: 1
BACK PAIN: 0
SORE THROAT: 0
COUGH: 0
VOICE CHANGE: 1
STRIDOR: 0
ABDOMINAL DISTENTION: 0
BLOOD IN STOOL: 0
EYE PAIN: 0
TROUBLE SWALLOWING: 0
ANAL BLEEDING: 0
SINUS PRESSURE: 0
DIARRHEA: 0
RECTAL PAIN: 0
EYE DISCHARGE: 0
NAUSEA: 1
PHOTOPHOBIA: 0
WHEEZING: 0
CHOKING: 0

## 2024-04-04 NOTE — PROGRESS NOTES
SHERIE Amaro                                     RPTD: 2024  [place] [address]                      MRN:  [id number]    LOC: EN                      ACCT: [id number]  SEX: M                      : 1990  AGE: 33 Y                         PATHOLOGY REPORT                      ATTN: ELIJAH KNOTT                      REQ: ELIJAH KNOTT      Copies To:   KADE DUFF      Clinical Information: GASTROESOPHAGEAL REFLUX DISEASE, UNSPECIFIED  WHETHER ESOPHAGITIS PRESENT    FINAL DIAGNOSIS:  Gastroesophageal junction, biopsy:            Squamous esophageal mucosa with no significant pathologic  abnormality.    Specimen:  BIOPSY G.E. JUNCTION      Gross Examination:  The container is labeled Chico Sanchez, GE junction.  Received in  formalin is a light tan fragment of mucosa measuring 0.2 cm.  The  tissue is entirely submitted in one cassette.  [id number]    Microscopic Examination:  Sections show squamous esophageal mucosa with no significant pathologic  abnormality.  There is no evidence of dysplasia or malignancy.  The  squamocolumnar junction is not present within the biopsy sample.    [id number]                                                      <Sign Out DrJason Signature>                                            KENN GARCES M.D., F.C.A.P.      [facility]  Printed on:  3/12/2024  [address] [place] [id number]  Original print date: 2024      Specimen Collected: 24 13:04 EDT Last Resulted: 24 13:41 EDT            Media Information                        Load More          An electronic signature was used to authenticate this note.    --Damon Singh MD

## 2024-05-07 ENCOUNTER — OFFICE VISIT (OUTPATIENT)
Dept: BARIATRICS/WEIGHT MGMT | Age: 34
End: 2024-05-07

## 2024-05-07 DIAGNOSIS — K21.9 GASTROESOPHAGEAL REFLUX DISEASE WITHOUT ESOPHAGITIS: Primary | ICD-10-CM

## 2024-05-07 NOTE — PROGRESS NOTES
Nutrition Education Completed with patient today for planned LINX procedure.  Education Included:  1. Written list of foods recommended and foods to avoid with sample menus and shopping list given to patient.  2.  Importance of small frequent meals -  minimum of 5-6 meals per day to aid in exercising the LINX  3.  Avoid all carbonated beverages or taking large gulps/amounts of drinks at one time to minimize tightness or discomfort.  May have improved tolerance of room temperature/warm fluids vs cold fluids  4.  To begin soft foods the day of surgery to active the LINX right away. Soft foods will allow the LINX to open and close with the passing of food, and help to prevent scar tissue from forming.  Advised NOT to consume only liquids or pureed foods.     Patient will have one week, four weeks, three months and one year post op visit in [facility] office with PA or MD.  May call dietitian number for any additional questions.     Zoe Baker RD, LD  Dietitian- [facility]  [facility]

## 2024-05-07 NOTE — PROGRESS NOTES
Chico Sanchez was seen today for pre-operative teaching for LINX.  He   was educated today on surgery procedure, possible complications, risks and benefits associated with LINX and instructed to remain NPO after midnight the night before surgery, or risk cancellation of surgical procedure.  Importance of recognizing signs and symptoms of potential complication were discussed as well as when to contact the physician.  We discussed the flow of events on the day of surgery.  Discussed dysphagia in detail and that this is normal and expected after LINX and may last up to 3 months post-op- may require prescription steroid or dilation. Reinforced importance of eating and returning to a normal diet as soon as tolerated after surgery to ensure proper healing at the implant site. Also discussed the possibility of esophageal spasms, pooling, belching and hiccups and the best ways to manage these potential issues. Will schedule 1 week, 1 month, 3 month and 1 year post-op follow up visit.

## 2024-05-11 NOTE — H&P
Chico Sanchez (:  1990)      ASSESSMENT:  1.  GERD  2.  Hiatal hernia  3.  Incarcerated umbilical hernia     PLAN:  1. Schedule Chico for robotic hiatal hernia repair with magnetic sphincter augmentation insertion (Linx); robotic possible open repair incarcerated umbilical hernia with possible mesh.  2. He will undergo pre-operative clearance per anesthesia guidelines with risk factors listed under the past medical history diagnosis & problem list.  3. The risks, benefits and alternatives were discussed with Chico including non-operative management.  The pros and cons of robotic, laparoscopic and open techniques were discussed.  The pros and cons of mesh insertion were discussed.  All questions answered. He understands and wishes to proceed with surgical intervention.  4. Restrictions discussed with Chico and he expresses understanding.  5. He is advised to call back directly if there are further questions/concerns, or if his symptoms worsen prior to surgery.  6.  Reviewed esophagram, upper endoscopy and pH Bravo with patient.  All questions answered.  7.  Dietary and lifestyle modification/restrictions discussed again with patient in regards to GERD and hiatal hernia.  8.  PPI/antacid as directed  9.  Follow-up with GI service as directed     SUBJECTIVE/OBJECTIVE:          Chief Complaint   Patient presents with    Results       CT abd/pelvis, esophagram, EGD/BRAVO      HPI  Chico is a 33-year-old male who presents for follow-up secondary to GERD and umbilical hernia.  He states he has had no improvement with symptoms.  Still having the discomfort around the umbilicus with increased activity, lifting and bending over.  Has a known umbilical hernia.  Also still having similar GERD issues.  Has underwent esophagram with marshmallow/bagel challenge that was within normal limits.  Has also underwent upper endoscopy as well as pH Bravo.  Found to have significant grade 3 reflux esophagitis as well as hiatal

## 2024-05-13 ENCOUNTER — PREP FOR PROCEDURE (OUTPATIENT)
Dept: SURGERY | Age: 34
End: 2024-05-13

## 2024-05-13 RX ORDER — DEXAMETHASONE SODIUM PHOSPHATE 10 MG/ML
10 INJECTION, SOLUTION INTRAMUSCULAR; INTRAVENOUS ONCE
Status: CANCELLED | OUTPATIENT
Start: 2024-05-13

## 2024-05-13 RX ORDER — SODIUM CHLORIDE 0.9 % (FLUSH) 0.9 %
5-40 SYRINGE (ML) INJECTION EVERY 12 HOURS SCHEDULED
Status: CANCELLED | OUTPATIENT
Start: 2024-05-13

## 2024-05-13 RX ORDER — SODIUM CHLORIDE 0.9 % (FLUSH) 0.9 %
5-40 SYRINGE (ML) INJECTION PRN
Status: CANCELLED | OUTPATIENT
Start: 2024-05-13

## 2024-05-13 RX ORDER — SODIUM CHLORIDE 9 MG/ML
INJECTION, SOLUTION INTRAVENOUS PRN
Status: CANCELLED | OUTPATIENT
Start: 2024-05-13

## 2024-05-13 RX ORDER — SODIUM CHLORIDE 9 MG/ML
INJECTION, SOLUTION INTRAVENOUS CONTINUOUS
Status: CANCELLED | OUTPATIENT
Start: 2024-05-13

## 2024-05-14 ENCOUNTER — HOSPITAL ENCOUNTER (EMERGENCY)
Age: 34
Discharge: HOME | End: 2024-05-14
Attending: STUDENT IN AN ORGANIZED HEALTH CARE EDUCATION/TRAINING PROGRAM
Payer: MEDICAID

## 2024-05-14 ENCOUNTER — TELEPHONE (OUTPATIENT)
Dept: SURGERY | Age: 34
End: 2024-05-14

## 2024-05-14 VITALS
WEIGHT: 120 LBS | SYSTOLIC BLOOD PRESSURE: 156 MMHG | DIASTOLIC BLOOD PRESSURE: 93 MMHG | RESPIRATION RATE: 20 BRPM | OXYGEN SATURATION: 99 % | BODY MASS INDEX: 21.26 KG/M2 | HEART RATE: 77 BPM | TEMPERATURE: 98.5 F

## 2024-05-14 DIAGNOSIS — H81.399 PERIPHERAL VERTIGO, UNSPECIFIED LATERALITY: Primary | ICD-10-CM

## 2024-05-14 PROCEDURE — 99282 EMERGENCY DEPT VISIT SF MDM: CPT

## 2024-05-14 ASSESSMENT — PAIN - FUNCTIONAL ASSESSMENT: PAIN_FUNCTIONAL_ASSESSMENT: NONE - DENIES PAIN

## 2024-05-14 NOTE — ED TRIAGE NOTES
Pt comes to ED with a tapping and swishing sound in his head that has been going on for 2 months. Pt states it comes and goes with no pattern. Pt denies hearing voices. Pt states he is taking his suboxone and remeron like he is supposed to. Pt states he gets dizzy and lightheaded as well sometimes. Pt denies having this feeling or hearing it in his ears.

## 2024-05-14 NOTE — ED PROVIDER NOTES
as needed (acid reflux), Disp-180 tablet, R-3Normal      ondansetron (ZOFRAN) 4 MG tablet Take 1 tablet by mouth every 8 hours as needed for Nausea or Vomiting, Disp-30 tablet, R-3Normal      pantoprazole (PROTONIX) 40 MG tablet Take 1 tablet by mouth daily, Disp-90 tablet, R-3Normal      mirtazapine (REMERON) 30 MG tablet Take 1 tablet by mouth nightly at bedtime., Disp-90 tablet, R-3Normal      gabapentin (NEURONTIN) 300 MG capsule Take 1 capsule by mouth 3 times daily for 180 days., Disp-270 capsule, R-1Normal      naloxone 4 MG/0.1ML LIQD nasal spray ADMINISTER A SINGLE SPRAY IN ONE NOSTRIL UPON SIGNS OF OPIOID OVERDOSE. CALL 911. REPEAT AFTER 3 MINUTES IF NO RESPONSE.Historical Med      buprenorphine-naloxone (SUBOXONE) 8-2 MG FILM SL film Place 0.5 Film under the tongue daily.Historical Med             ALLERGIES     has No Known Allergies.    FAMILY HISTORY     He indicated that his mother is alive. He indicated that his father is .       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   HENT:      Head: Normocephalic and atraumatic.      Right Ear: Ear canal and external ear normal.      Left Ear:

## 2024-05-14 NOTE — TELEPHONE ENCOUNTER
Pt called in stating he started spitting up \"green stuff with tingling in his throat\" yesterday.   States it is going into his chest.   Advised pt surgery today would need cancelled and rescheduled. Pt states he is going to start on some antibiotics.      Surgery r/s;d to 5/30, arrive at 7:30 am. Pt voiced understanding.

## 2024-05-22 ENCOUNTER — OFFICE VISIT (OUTPATIENT)
Dept: FAMILY MEDICINE CLINIC | Age: 34
End: 2024-05-22
Payer: MEDICAID

## 2024-05-22 VITALS
WEIGHT: 117.2 LBS | HEIGHT: 63 IN | RESPIRATION RATE: 12 BRPM | TEMPERATURE: 98.5 F | SYSTOLIC BLOOD PRESSURE: 120 MMHG | DIASTOLIC BLOOD PRESSURE: 82 MMHG | HEART RATE: 92 BPM | BODY MASS INDEX: 20.77 KG/M2 | OXYGEN SATURATION: 97 %

## 2024-05-22 DIAGNOSIS — H65.92 FLUID LEVEL BEHIND TYMPANIC MEMBRANE OF LEFT EAR: Primary | ICD-10-CM

## 2024-05-22 DIAGNOSIS — H93.19 TINNITUS, UNSPECIFIED LATERALITY: ICD-10-CM

## 2024-05-22 PROCEDURE — G8420 CALC BMI NORM PARAMETERS: HCPCS | Performed by: NURSE PRACTITIONER

## 2024-05-22 PROCEDURE — 1036F TOBACCO NON-USER: CPT | Performed by: NURSE PRACTITIONER

## 2024-05-22 PROCEDURE — 99214 OFFICE O/P EST MOD 30 MIN: CPT | Performed by: NURSE PRACTITIONER

## 2024-05-22 PROCEDURE — G8427 DOCREV CUR MEDS BY ELIG CLIN: HCPCS | Performed by: NURSE PRACTITIONER

## 2024-05-22 RX ORDER — FLUTICASONE PROPIONATE 50 MCG
2 SPRAY, SUSPENSION (ML) NASAL DAILY
Qty: 16 G | Refills: 1 | Status: SHIPPED | OUTPATIENT
Start: 2024-05-22

## 2024-05-22 RX ORDER — PREDNISONE 20 MG/1
40 TABLET ORAL DAILY
Qty: 10 TABLET | Refills: 0 | Status: SHIPPED | OUTPATIENT
Start: 2024-05-22 | End: 2024-05-27

## 2024-05-22 ASSESSMENT — PATIENT HEALTH QUESTIONNAIRE - PHQ9
SUM OF ALL RESPONSES TO PHQ9 QUESTIONS 1 & 2: 0
SUM OF ALL RESPONSES TO PHQ QUESTIONS 1-9: 0
SUM OF ALL RESPONSES TO PHQ QUESTIONS 1-9: 0
1. LITTLE INTEREST OR PLEASURE IN DOING THINGS: NOT AT ALL
SUM OF ALL RESPONSES TO PHQ QUESTIONS 1-9: 0
SUM OF ALL RESPONSES TO PHQ QUESTIONS 1-9: 0
2. FEELING DOWN, DEPRESSED OR HOPELESS: NOT AT ALL

## 2024-05-22 NOTE — PROGRESS NOTES
SUBJECTIVE:  Chico Sanchez is a 34 y.o. y/o male that presents with Cough (Whooshing in head) and Congestion  .    HPI:      Symptoms have been present for 6 day(s).  Symptoms are better since they initially started.    Fever? No  Runny nose or congestion?  Yes  Cough?  Yes  Sore throat?  No  Shortness of breath/Wheezing? No  Other associated symptoms?  none      Known COVID exposures or RFs?  no  Smoker?  Quit   Preexisting Respiratory conditions?  none    Tinnitus   Worse in left ear  Went to ER   Was sent home   No pain, vision changes, hearing loss  Says it is pretty much constant    Past Medical History:   Diagnosis Date    Anxiety     Depression     GERD (gastroesophageal reflux disease)     Hepatitis C          Tobacco Use      Smoking status: Former        Packs/day: 0.50        Years: 0.5 packs/day for 5.0 years (2.5 ttl pk-yrs)        Types: Cigarettes      Smokeless tobacco: Never      Whooshing sound in bilateral ears   Started a couple weeks ago  No inciting factors  Comes and goes   No alleviating factors  Went to the ER   No imaging   Had a couple HA since its started         OBJECTIVE:  /82   Pulse 92   Temp 98.5 °F (36.9 °C) (Oral)   Resp 12   Ht 1.6 m (5' 3\")   Wt 53.2 kg (117 lb 3.2 oz)   SpO2 97%   BMI 20.76 kg/m²   General appearance: alert, well appearing, and in no distress.  ENT exam reveals - bilateral TM fluid noted.   CVS exam: normal rate, regular rhythm, normal S1, S2, no murmurs, rubs, clicks or gallops.  Chest:clear to auscultation, no wheezes, rales or rhonchi, symmetric air entry.   Abdominal exam: soft, nontender, nondistended, no masses or organomegaly.  Extremities:  No clubbing, cyanosis or edema  Skin exam - normal coloration and turgor, no rashes, no suspicious skin lesions noted.  Psych -  Affect appropriate.  Thought process is normal without evidence of depression or psychosis.    Good insight and appropriae interaction.  Cognition and memory appear to be

## 2024-05-24 ENCOUNTER — TELEPHONE (OUTPATIENT)
Dept: FAMILY MEDICINE CLINIC | Age: 34
End: 2024-05-24

## 2024-05-24 DIAGNOSIS — H93.13 TINNITUS OF BOTH EARS: Primary | ICD-10-CM

## 2024-05-24 NOTE — TELEPHONE ENCOUNTER
----- Message from KATINA Floyd CNP sent at 5/22/2024 11:07 AM EDT -----  Please call pt on Friday am and see how the whooshing sound is since starting the Prednisone.  If no better, will get imaging of his brain.  If improving, will check back the following week.

## 2024-05-24 NOTE — PROGRESS NOTES
PAT phoned pt to check on his symptoms post seeing his PCP 5/22.  He states he has started the prednisone but still hears \"whooshing in head\" and some pounding.  He denies any chest congestion or cough.  He is to see PCP 5/28 again.  Inst pt to phone [facility] office after his appointment to let them know if he will still be able to have his sx on 5/30.    Phoned [facility] office and spoke to Rose regarding the above.  She states she will look for PCP note on 5/28.

## 2024-05-24 NOTE — TELEPHONE ENCOUNTER
Called central scheduling and got patient scheduled for 6/13/24 at 6:30 pm. Patient was informed of instructions and where to go and time works okay for him.

## 2024-05-24 NOTE — PROGRESS NOTES
Pt denies any other changes in his health hx and denies needing re instructed.  Reminded pt npo after midnight and to follow inst from [facility] office.

## 2024-05-28 NOTE — PROGRESS NOTES
Chico states he finished his RX from PCP and he feels much better, no more whooshing sounds, no more nasal congestion or stuffy nose. He stated the MRI could not be scheduled for a couple weeks- I instructed his to discuss need for MRI with AJ Osborne CNP- PCP.  I explained he should be good to go for Dr. Singh's linx procedure on 5/30/24. Chico voiced understanding.

## 2024-05-30 ENCOUNTER — ANESTHESIA (OUTPATIENT)
Dept: OPERATING ROOM | Age: 34
End: 2024-05-30
Payer: MEDICAID

## 2024-05-30 ENCOUNTER — HOSPITAL ENCOUNTER (OUTPATIENT)
Age: 34
Setting detail: OUTPATIENT SURGERY
Discharge: HOME | End: 2024-05-30
Attending: SURGERY | Admitting: SURGERY
Payer: MEDICAID

## 2024-05-30 ENCOUNTER — ANESTHESIA EVENT (OUTPATIENT)
Dept: OPERATING ROOM | Age: 34
End: 2024-05-30
Payer: MEDICAID

## 2024-05-30 VITALS
OXYGEN SATURATION: 98 % | RESPIRATION RATE: 16 BRPM | HEIGHT: 62 IN | WEIGHT: 115.2 LBS | SYSTOLIC BLOOD PRESSURE: 125 MMHG | DIASTOLIC BLOOD PRESSURE: 85 MMHG | HEART RATE: 85 BPM | TEMPERATURE: 97.6 F | BODY MASS INDEX: 21.2 KG/M2

## 2024-05-30 DIAGNOSIS — K44.9 HIATAL HERNIA: Primary | ICD-10-CM

## 2024-05-30 DIAGNOSIS — K21.9 GASTROESOPHAGEAL REFLUX DISEASE, UNSPECIFIED WHETHER ESOPHAGITIS PRESENT: ICD-10-CM

## 2024-05-30 PROCEDURE — 2580000003 HC RX 258: Performed by: NURSE PRACTITIONER

## 2024-05-30 PROCEDURE — 6360000002 HC RX W HCPCS: Performed by: NURSE ANESTHETIST, CERTIFIED REGISTERED

## 2024-05-30 PROCEDURE — 7100000001 HC PACU RECOVERY - ADDTL 15 MIN: Performed by: SURGERY

## 2024-05-30 PROCEDURE — 3600000019 HC SURGERY ROBOT ADDTL 15MIN: Performed by: SURGERY

## 2024-05-30 PROCEDURE — 6370000000 HC RX 637 (ALT 250 FOR IP): Performed by: SURGERY

## 2024-05-30 PROCEDURE — 2720000010 HC SURG SUPPLY STERILE: Performed by: SURGERY

## 2024-05-30 PROCEDURE — 2500000003 HC RX 250 WO HCPCS: Performed by: NURSE ANESTHETIST, CERTIFIED REGISTERED

## 2024-05-30 PROCEDURE — 3700000000 HC ANESTHESIA ATTENDED CARE: Performed by: SURGERY

## 2024-05-30 PROCEDURE — 6360000002 HC RX W HCPCS: Performed by: ANESTHESIOLOGY

## 2024-05-30 PROCEDURE — 7100000010 HC PHASE II RECOVERY - FIRST 15 MIN: Performed by: SURGERY

## 2024-05-30 PROCEDURE — 7100000011 HC PHASE II RECOVERY - ADDTL 15 MIN: Performed by: SURGERY

## 2024-05-30 PROCEDURE — 6370000000 HC RX 637 (ALT 250 FOR IP): Performed by: NURSE ANESTHETIST, CERTIFIED REGISTERED

## 2024-05-30 PROCEDURE — C1889 IMPLANT/INSERT DEVICE, NOC: HCPCS | Performed by: SURGERY

## 2024-05-30 PROCEDURE — 6360000002 HC RX W HCPCS

## 2024-05-30 PROCEDURE — S2900 ROBOTIC SURGICAL SYSTEM: HCPCS | Performed by: SURGERY

## 2024-05-30 PROCEDURE — 6360000002 HC RX W HCPCS: Performed by: NURSE PRACTITIONER

## 2024-05-30 PROCEDURE — 3600000009 HC SURGERY ROBOT BASE: Performed by: SURGERY

## 2024-05-30 PROCEDURE — 7100000000 HC PACU RECOVERY - FIRST 15 MIN: Performed by: SURGERY

## 2024-05-30 PROCEDURE — 3700000001 HC ADD 15 MINUTES (ANESTHESIA): Performed by: SURGERY

## 2024-05-30 PROCEDURE — 6360000002 HC RX W HCPCS: Performed by: SURGERY

## 2024-05-30 PROCEDURE — 2580000003 HC RX 258: Performed by: SURGERY

## 2024-05-30 PROCEDURE — 43281 LAP PARAESOPHAG HERN REPAIR: CPT | Performed by: SURGERY

## 2024-05-30 PROCEDURE — 2709999900 HC NON-CHARGEABLE SUPPLY: Performed by: SURGERY

## 2024-05-30 DEVICE — IMPLANT ANTIREFLX SZ 14 LAPSCP FUNDIC SPRNG MRI COMPATIBLE: Type: IMPLANTABLE DEVICE | Status: FUNCTIONAL

## 2024-05-30 RX ORDER — ONDANSETRON 2 MG/ML
4 INJECTION INTRAMUSCULAR; INTRAVENOUS EVERY 6 HOURS PRN
Status: CANCELLED | OUTPATIENT
Start: 2024-05-30

## 2024-05-30 RX ORDER — KETAMINE HCL IN NACL, ISO-OSM 100MG/10ML
SYRINGE (ML) INJECTION PRN
Status: DISCONTINUED | OUTPATIENT
Start: 2024-05-30 | End: 2024-05-30 | Stop reason: SDUPTHER

## 2024-05-30 RX ORDER — SODIUM CHLORIDE 9 MG/ML
INJECTION, SOLUTION INTRAVENOUS PRN
Status: CANCELLED | OUTPATIENT
Start: 2024-05-30

## 2024-05-30 RX ORDER — ACETAMINOPHEN 325 MG/1
650 TABLET ORAL EVERY 4 HOURS PRN
Status: CANCELLED | OUTPATIENT
Start: 2024-05-30

## 2024-05-30 RX ORDER — ONDANSETRON 2 MG/ML
4 INJECTION INTRAMUSCULAR; INTRAVENOUS
Status: DISCONTINUED | OUTPATIENT
Start: 2024-05-30 | End: 2024-05-30 | Stop reason: HOSPADM

## 2024-05-30 RX ORDER — LABETALOL HYDROCHLORIDE 5 MG/ML
10 INJECTION INTRAVENOUS
Status: DISCONTINUED | OUTPATIENT
Start: 2024-05-30 | End: 2024-05-30 | Stop reason: HOSPADM

## 2024-05-30 RX ORDER — ONDANSETRON 2 MG/ML
INJECTION INTRAMUSCULAR; INTRAVENOUS PRN
Status: DISCONTINUED | OUTPATIENT
Start: 2024-05-30 | End: 2024-05-30 | Stop reason: SDUPTHER

## 2024-05-30 RX ORDER — SODIUM CHLORIDE 0.9 % (FLUSH) 0.9 %
5-40 SYRINGE (ML) INJECTION PRN
Status: DISCONTINUED | OUTPATIENT
Start: 2024-05-30 | End: 2024-05-30 | Stop reason: HOSPADM

## 2024-05-30 RX ORDER — DEXAMETHASONE SODIUM PHOSPHATE 10 MG/ML
INJECTION, EMULSION INTRAMUSCULAR; INTRAVENOUS PRN
Status: DISCONTINUED | OUTPATIENT
Start: 2024-05-30 | End: 2024-05-30 | Stop reason: SDUPTHER

## 2024-05-30 RX ORDER — BUPIVACAINE HYDROCHLORIDE 5 MG/ML
INJECTION, SOLUTION PERINEURAL PRN
Status: DISCONTINUED | OUTPATIENT
Start: 2024-05-30 | End: 2024-05-30 | Stop reason: ALTCHOICE

## 2024-05-30 RX ORDER — MEPERIDINE HYDROCHLORIDE 25 MG/ML
25 INJECTION INTRAMUSCULAR; INTRAVENOUS; SUBCUTANEOUS ONCE
Status: COMPLETED | OUTPATIENT
Start: 2024-05-30 | End: 2024-05-30

## 2024-05-30 RX ORDER — OXYCODONE HYDROCHLORIDE 5 MG/1
5 TABLET ORAL EVERY 4 HOURS PRN
Status: DISCONTINUED | OUTPATIENT
Start: 2024-05-30 | End: 2024-05-30 | Stop reason: HOSPADM

## 2024-05-30 RX ORDER — MIDAZOLAM HYDROCHLORIDE 1 MG/ML
INJECTION INTRAMUSCULAR; INTRAVENOUS PRN
Status: DISCONTINUED | OUTPATIENT
Start: 2024-05-30 | End: 2024-05-30 | Stop reason: SDUPTHER

## 2024-05-30 RX ORDER — KETOROLAC TROMETHAMINE 10 MG/1
10 TABLET, FILM COATED ORAL EVERY 8 HOURS PRN
Qty: 15 TABLET | Refills: 0 | Status: SHIPPED | OUTPATIENT
Start: 2024-05-30

## 2024-05-30 RX ORDER — PROPOFOL 10 MG/ML
INJECTION, EMULSION INTRAVENOUS PRN
Status: DISCONTINUED | OUTPATIENT
Start: 2024-05-30 | End: 2024-05-30 | Stop reason: SDUPTHER

## 2024-05-30 RX ORDER — SODIUM CHLORIDE 0.9 % (FLUSH) 0.9 %
5-40 SYRINGE (ML) INJECTION PRN
Status: CANCELLED | OUTPATIENT
Start: 2024-05-30

## 2024-05-30 RX ORDER — SODIUM CHLORIDE 9 MG/ML
INJECTION, SOLUTION INTRAVENOUS PRN
Status: DISCONTINUED | OUTPATIENT
Start: 2024-05-30 | End: 2024-05-30 | Stop reason: HOSPADM

## 2024-05-30 RX ORDER — SODIUM CHLORIDE 0.9 % (FLUSH) 0.9 %
5-40 SYRINGE (ML) INJECTION EVERY 12 HOURS SCHEDULED
Status: DISCONTINUED | OUTPATIENT
Start: 2024-05-30 | End: 2024-05-30 | Stop reason: HOSPADM

## 2024-05-30 RX ORDER — FAMOTIDINE 20 MG/1
20 TABLET, FILM COATED ORAL 2 TIMES DAILY
Status: CANCELLED | OUTPATIENT
Start: 2024-05-30

## 2024-05-30 RX ORDER — FENTANYL CITRATE 50 UG/ML
50 INJECTION, SOLUTION INTRAMUSCULAR; INTRAVENOUS EVERY 5 MIN PRN
Status: DISCONTINUED | OUTPATIENT
Start: 2024-05-30 | End: 2024-05-30 | Stop reason: HOSPADM

## 2024-05-30 RX ORDER — KETOROLAC TROMETHAMINE 30 MG/ML
INJECTION, SOLUTION INTRAMUSCULAR; INTRAVENOUS PRN
Status: DISCONTINUED | OUTPATIENT
Start: 2024-05-30 | End: 2024-05-30 | Stop reason: SDUPTHER

## 2024-05-30 RX ORDER — MEPERIDINE HYDROCHLORIDE 25 MG/ML
INJECTION INTRAMUSCULAR; INTRAVENOUS; SUBCUTANEOUS
Status: COMPLETED
Start: 2024-05-30 | End: 2024-05-30

## 2024-05-30 RX ORDER — NALOXONE HYDROCHLORIDE 0.4 MG/ML
INJECTION, SOLUTION INTRAMUSCULAR; INTRAVENOUS; SUBCUTANEOUS PRN
Status: DISCONTINUED | OUTPATIENT
Start: 2024-05-30 | End: 2024-05-30 | Stop reason: HOSPADM

## 2024-05-30 RX ORDER — HYDROCODONE BITARTRATE AND ACETAMINOPHEN 5; 325 MG/1; MG/1
1-2 TABLET ORAL EVERY 6 HOURS PRN
Qty: 20 TABLET | Refills: 0 | Status: SHIPPED | OUTPATIENT
Start: 2024-05-30 | End: 2024-06-05

## 2024-05-30 RX ORDER — LORAZEPAM 2 MG/ML
0.5 INJECTION INTRAMUSCULAR EVERY 5 MIN PRN
Status: COMPLETED | OUTPATIENT
Start: 2024-05-30 | End: 2024-05-30

## 2024-05-30 RX ORDER — LIDOCAINE HYDROCHLORIDE 20 MG/ML
INJECTION, SOLUTION INTRAVENOUS PRN
Status: DISCONTINUED | OUTPATIENT
Start: 2024-05-30 | End: 2024-05-30 | Stop reason: SDUPTHER

## 2024-05-30 RX ORDER — HYDRALAZINE HYDROCHLORIDE 20 MG/ML
10 INJECTION INTRAMUSCULAR; INTRAVENOUS
Status: DISCONTINUED | OUTPATIENT
Start: 2024-05-30 | End: 2024-05-30 | Stop reason: HOSPADM

## 2024-05-30 RX ORDER — OXYCODONE HYDROCHLORIDE 5 MG/1
10 TABLET ORAL EVERY 4 HOURS PRN
Status: DISCONTINUED | OUTPATIENT
Start: 2024-05-30 | End: 2024-05-30 | Stop reason: HOSPADM

## 2024-05-30 RX ORDER — ROCURONIUM BROMIDE 10 MG/ML
INJECTION, SOLUTION INTRAVENOUS PRN
Status: DISCONTINUED | OUTPATIENT
Start: 2024-05-30 | End: 2024-05-30 | Stop reason: SDUPTHER

## 2024-05-30 RX ORDER — FENTANYL CITRATE 50 UG/ML
INJECTION, SOLUTION INTRAMUSCULAR; INTRAVENOUS
Status: COMPLETED
Start: 2024-05-30 | End: 2024-05-30

## 2024-05-30 RX ORDER — DEXAMETHASONE SODIUM PHOSPHATE 10 MG/ML
10 INJECTION, EMULSION INTRAMUSCULAR; INTRAVENOUS ONCE
Status: COMPLETED | OUTPATIENT
Start: 2024-05-30 | End: 2024-05-30

## 2024-05-30 RX ORDER — SODIUM CHLORIDE 0.9 % (FLUSH) 0.9 %
5-40 SYRINGE (ML) INJECTION EVERY 12 HOURS SCHEDULED
Status: CANCELLED | OUTPATIENT
Start: 2024-05-30

## 2024-05-30 RX ORDER — MORPHINE SULFATE 4 MG/ML
4 INJECTION, SOLUTION INTRAMUSCULAR; INTRAVENOUS
Status: CANCELLED | OUTPATIENT
Start: 2024-05-30

## 2024-05-30 RX ORDER — SODIUM CHLORIDE 9 MG/ML
INJECTION, SOLUTION INTRAVENOUS CONTINUOUS
Status: DISCONTINUED | OUTPATIENT
Start: 2024-05-30 | End: 2024-05-30 | Stop reason: HOSPADM

## 2024-05-30 RX ORDER — ONDANSETRON 4 MG/1
4 TABLET, ORALLY DISINTEGRATING ORAL EVERY 8 HOURS PRN
Status: CANCELLED | OUTPATIENT
Start: 2024-05-30

## 2024-05-30 RX ORDER — MORPHINE SULFATE 2 MG/ML
2 INJECTION, SOLUTION INTRAMUSCULAR; INTRAVENOUS
Status: CANCELLED | OUTPATIENT
Start: 2024-05-30

## 2024-05-30 RX ORDER — LIDOCAINE HYDROCHLORIDE 40 MG/ML
SOLUTION TOPICAL PRN
Status: DISCONTINUED | OUTPATIENT
Start: 2024-05-30 | End: 2024-05-30 | Stop reason: SDUPTHER

## 2024-05-30 RX ORDER — FENTANYL CITRATE 50 UG/ML
INJECTION, SOLUTION INTRAMUSCULAR; INTRAVENOUS PRN
Status: DISCONTINUED | OUTPATIENT
Start: 2024-05-30 | End: 2024-05-30 | Stop reason: SDUPTHER

## 2024-05-30 RX ADMIN — MIDAZOLAM 2 MG: 1 INJECTION INTRAMUSCULAR; INTRAVENOUS at 09:21

## 2024-05-30 RX ADMIN — ONDANSETRON 4 MG: 2 INJECTION INTRAMUSCULAR; INTRAVENOUS at 10:08

## 2024-05-30 RX ADMIN — FENTANYL CITRATE 50 MCG: 50 INJECTION INTRAMUSCULAR; INTRAVENOUS at 10:50

## 2024-05-30 RX ADMIN — Medication 25 MG: at 09:23

## 2024-05-30 RX ADMIN — SUGAMMADEX 200 MG: 100 INJECTION, SOLUTION INTRAVENOUS at 10:08

## 2024-05-30 RX ADMIN — WATER 2000 MG: 1 INJECTION INTRAMUSCULAR; INTRAVENOUS; SUBCUTANEOUS at 09:27

## 2024-05-30 RX ADMIN — FENTANYL CITRATE 100 MCG: 50 INJECTION, SOLUTION INTRAMUSCULAR; INTRAVENOUS at 10:08

## 2024-05-30 RX ADMIN — DEXAMETHASONE SODIUM PHOSPHATE 10 MG: 10 INJECTION, EMULSION INTRAMUSCULAR; INTRAVENOUS at 08:06

## 2024-05-30 RX ADMIN — LIDOCAINE HYDROCHLORIDE 60 MG: 20 INJECTION, SOLUTION INTRAVENOUS at 09:23

## 2024-05-30 RX ADMIN — MEPERIDINE HYDROCHLORIDE 25 MG: 25 INJECTION INTRAMUSCULAR; INTRAVENOUS; SUBCUTANEOUS at 10:30

## 2024-05-30 RX ADMIN — ROCURONIUM BROMIDE 50 MG: 10 INJECTION INTRAVENOUS at 09:23

## 2024-05-30 RX ADMIN — LORAZEPAM 0.5 MG: 2 INJECTION INTRAMUSCULAR; INTRAVENOUS at 11:00

## 2024-05-30 RX ADMIN — SODIUM CHLORIDE: 9 INJECTION, SOLUTION INTRAVENOUS at 08:05

## 2024-05-30 RX ADMIN — KETOROLAC TROMETHAMINE 30 MG: 30 INJECTION, SOLUTION INTRAMUSCULAR; INTRAVENOUS at 10:08

## 2024-05-30 RX ADMIN — DEXAMETHASONE SODIUM PHOSPHATE 6 MG: 10 INJECTION, EMULSION INTRAMUSCULAR; INTRAVENOUS at 09:30

## 2024-05-30 RX ADMIN — PROPOFOL 200 MG: 10 INJECTION, EMULSION INTRAVENOUS at 09:23

## 2024-05-30 RX ADMIN — MEPERIDINE HYDROCHLORIDE 25 MG: 25 INJECTION, SOLUTION INTRAMUSCULAR; INTRAVENOUS; SUBCUTANEOUS at 10:30

## 2024-05-30 RX ADMIN — Medication 10 MG: at 09:33

## 2024-05-30 RX ADMIN — LIDOCAINE HYDROCHLORIDE 4 ML: 40 SOLUTION TOPICAL at 09:25

## 2024-05-30 RX ADMIN — FENTANYL CITRATE 50 MCG: 50 INJECTION INTRAMUSCULAR; INTRAVENOUS at 10:55

## 2024-05-30 RX ADMIN — FENTANYL CITRATE 100 MCG: 50 INJECTION, SOLUTION INTRAMUSCULAR; INTRAVENOUS at 09:21

## 2024-05-30 RX ADMIN — SODIUM CHLORIDE: 9 INJECTION, SOLUTION INTRAVENOUS at 10:15

## 2024-05-30 RX ADMIN — OXYCODONE HYDROCHLORIDE 10 MG: 5 TABLET ORAL at 12:55

## 2024-05-30 RX ADMIN — LORAZEPAM 0.5 MG: 2 INJECTION INTRAMUSCULAR; INTRAVENOUS at 11:05

## 2024-05-30 ASSESSMENT — PAIN SCALES - GENERAL
PAINLEVEL_OUTOF10: 7
PAINLEVEL_OUTOF10: 6
PAINLEVEL_OUTOF10: 6
PAINLEVEL_OUTOF10: 7

## 2024-05-30 ASSESSMENT — PAIN DESCRIPTION - DESCRIPTORS
DESCRIPTORS: ACHING
DESCRIPTORS: PRESSURE
DESCRIPTORS: PRESSURE
DESCRIPTORS: ACHING
DESCRIPTORS: ACHING

## 2024-05-30 ASSESSMENT — PAIN - FUNCTIONAL ASSESSMENT
PAIN_FUNCTIONAL_ASSESSMENT: ACTIVITIES ARE NOT PREVENTED
PAIN_FUNCTIONAL_ASSESSMENT: 0-10

## 2024-05-30 ASSESSMENT — PAIN DESCRIPTION - LOCATION
LOCATION: ABDOMEN
LOCATION: ABDOMEN
LOCATION: SHOULDER
LOCATION: CHEST

## 2024-05-30 ASSESSMENT — PAIN DESCRIPTION - ORIENTATION
ORIENTATION: RIGHT;LEFT
ORIENTATION: LEFT

## 2024-05-30 NOTE — OP NOTE
[place] [id number]                            OPERATIVE REPORT      PATIENT NAME: WALKER BRAND               : 1990  MED REC NO: [id number]                       ROOM: [facility]                                               (General) POOL                                               RM    ACCOUNT NO: [id number]                       ADMIT DATE: 2024  PROVIDER: Damon Singh MD      DATE OF PROCEDURE:  2024    SURGEON:  Damon Singh MD    PREOPERATIVE DIAGNOSES:    1. Hiatal hernia.  2. Gastroesophageal reflux disease.  3. Umbilical hernia.    POSTOPERATIVE DIAGNOSES:    1. Hiatal hernia.  2. Gastroesophageal reflux disease.  3. Umbilical hernia.    PROCEDURE:    1. Robotic repair, hiatal hernia (3 cm defect), with magnetic sphincter augmentation insertion (LINX #14).  2. Bilateral abdominal wall TAP block.  3. Primary repair of small umbilical hernia (1 cm defect).    ASSISTANT:  You Contreras.    ANESTHESIA:  General/local.    ESTIMATED BLOOD LOSS:  10 mL.    DRAINS:  None.    COMPLICATIONS:  None.    DISPOSITION:  Stable to the recovery room.    INDICATIONS:  The patient is a 34-year-old male, who I had seen in the office secondary to a symptomatic hiatal hernia with GERD.  Also, small umbilical hernia.  Both operative and nonoperative intervention plans were discussed.  Risks of surgery were further discussed.  Some of the risks included, but were not limited to, bleeding, infection, the need for reoperation, severe chronic postoperative pain or numbness, major vascular or nerve injury, cardiopulmonary complications, anesthetic complications, seroma or hematoma formation, wound breakdown, recurrence of the hernia, chronic abdominal pain, major esophageal or vagal nerve damage, severe dysphagia, and death.  After all of the questions were answered in their entirety and the patient was completely aware of the

## 2024-05-30 NOTE — PROGRESS NOTES
Pt returned to [facility] room 14. Vitals and assessment as charted. 0.9 infusing,  to count from PACU. Pt has crackers and water. Family at the bedside. Pt and family verbalized understanding of discharge criteria and call light use. Call light in reach.

## 2024-05-30 NOTE — ANESTHESIA POSTPROCEDURE EVALUATION
Department of Anesthesiology  Postprocedure Note    Patient: Chico Sanchez  MRN: [id number]  YOB: 1990  Date of evaluation: 5/30/2024    Procedure Summary       Date: 05/30/24 Room / Location: [facility] OR 08 / [facility] OR    Anesthesia Start: 0921 Anesthesia Stop: 1027    Procedure: Robot Hiatal Hernia Repair with Linx Diagnosis:       Hiatal hernia      Gastroesophageal reflux disease, unspecified whether esophagitis present      (Hiatal hernia [K44.9])      (Gastroesophageal reflux disease, unspecified whether esophagitis present [K21.9])    Surgeons: Damon Singh MD Responsible Provider: Lamonte Pastrana MD    Anesthesia Type: General ASA Status: 3            Anesthesia Type: General    Mandeep Phase I: Mandeep Score: 9    Mandeep Phase II:      Anesthesia Post Evaluation    Patient location during evaluation: PACU  Patient participation: complete - patient participated  Level of consciousness: awake and alert  Airway patency: patent  Nausea & Vomiting: no nausea  Cardiovascular status: blood pressure returned to baseline and hemodynamically stable  Respiratory status: acceptable and spontaneous ventilation  Hydration status: euvolemic  Pain management: adequate    No notable events documented.

## 2024-05-30 NOTE — BRIEF OP NOTE
Brief Postoperative Note      Patient: Chico Sanchez  YOB: 1990  MRN: [id number]    Date of Procedure: 5/30/2024    Pre-Op Diagnosis Codes:     * Hiatal hernia [K44.9]     * Gastroesophageal reflux disease, unspecified whether esophagitis present [K21.9]; incarcerated umbilical hernia    Post-Op Diagnosis: Same       Procedure(s):  Robot Hiatal Hernia Repair with Linx (#14); bilateral abdominal wall tap block; primary repair incarcerated umbilical hernia (1 cm defect)    Surgeon(s):  Damon Singh MD    Assistant:  First Assistant: Delonte Contreras RN    Anesthesia: General    Estimated Blood Loss (mL): 10ml    Complications: None    Specimens:   * No specimens in log *    Implants:  Implant Name Type Inv. Item Serial No.  Lot No. LRB No. Used Action   IMPLANT ANTIREFLX SZ 14 LAPSCP FUNDIC SPRNG MRI COMPATIBLE - [id number]  IMPLANT ANTIREFLX SZ 14 LAPSCP FUNDIC SPRNG MRI COMPATIBLE  [facility] [id number] N/A 1 Implanted         Drains: * No LDAs found *    Findings:  Infection Present At Time Of Surgery (PATOS) (choose all levels that have infection present):  No infection present    Other Findings: see op note    Electronically signed by Damon Singh MD on 5/30/2024 at 10:32 AM

## 2024-05-30 NOTE — ANESTHESIA PRE PROCEDURE
Department of Anesthesiology  Preprocedure Note       Name:  Chico Sanchez   Age:  34 y.o.  :  1990                                          MRN:  [id number]         Date:  2024      Surgeon: Surgeon(s):  Damon Singh MD    Procedure: Procedure(s):  Robot Hiatal Hernia Repair with Linx    Medications prior to admission:   Prior to Admission medications    Medication Sig Start Date End Date Taking? Authorizing Provider   fluticasone (FLONASE) 50 MCG/ACT nasal spray 2 sprays by Each Nostril route daily 24   Nubia Osborne APRN - CNP   famotidine (PEPCID) 20 MG tablet Take 1 tablet by mouth 2 times daily as needed (acid reflux) 23   Nubia Osborne APRN - CNP   ondansetron (ZOFRAN) 4 MG tablet Take 1 tablet by mouth every 8 hours as needed for Nausea or Vomiting 23   Nubia Osborne APRN - CNP   pantoprazole (PROTONIX) 40 MG tablet Take 1 tablet by mouth daily 23   Nubia Osborne APRN - CNP   mirtazapine (REMERON) 30 MG tablet Take 1 tablet by mouth nightly at bedtime. 23   Nubia Osborne APRN - CNP   gabapentin (NEURONTIN) 300 MG capsule Take 1 capsule by mouth 3 times daily for 180 days. 23  Nubia Osborne APRN - CNP   naloxone 4 MG/0.1ML LIQD nasal spray ADMINISTER A SINGLE SPRAY IN ONE NOSTRIL UPON SIGNS OF OPIOID OVERDOSE. CALL 911. REPEAT AFTER 3 MINUTES IF NO RESPONSE.  Patient not taking: Reported on 2024 1/10/23   ProviderJon MD   buprenorphine-naloxone (SUBOXONE) 8-2 MG FILM SL film Place 0.5 Film under the tongue daily.    ProviderJon MD       Current medications:    Current Facility-Administered Medications   Medication Dose Route Frequency Provider Last Rate Last Admin   • sodium chloride flush 0.9 % injection 5-40 mL  5-40 mL IntraVENous 2 times per day Javier Cazares APRN - CNP       • sodium chloride flush 0.9 % injection 5-40 mL  5-40 mL IntraVENous PRN Javier Cazares APRN -

## 2024-05-30 NOTE — PROGRESS NOTES
Pt has met discharge criteria and states he is ready for discharge to home. IV removed, gauze and tape applied. Dressed in own clothes and personal belongings gathered. Discharge instructions (with opioid medication education information) given to pt and family; pt and family verbalized understanding of discharge instructions, prescriptions and follow up appointments. Pt transported to discharge lobby by [facility] staff.       Eliquis 5 mg oral tablet: 1 tab(s) orally 2 times a day  finasteride 5 mg oral tablet: 1 tab(s) orally once a day  Flomax 0.4 mg oral capsule: 1 cap(s) orally once a day   glimepiride 1 mg oral tablet: 2 tab(s) orally once a day  HumaLOG 100 units/mL injectable solution: injectable once a day (after a meal)  ****Sliding Scale****  Lantus 100 units/mL subcutaneous solution: 13 unit(s) subcutaneous once a day  loperamide 2 mg oral capsule: 1 cap(s) orally every 4 hours, As Needed - for diarrhea  metoprolol succinate 25 mg oral tablet, extended release: 1 tab(s) orally once a day  omeprazole 40 mg oral delayed release capsule: 1 cap(s) orally once a day  Tradjenta 5 mg oral tablet: 1 tab(s) orally once a day  valsartan 160 mg oral tablet: 1 tab(s) orally once a day  Voltaren 1% topical gel: Apply topically to affected area 4 times a day, As Needed Eliquis 5 mg oral tablet: 1 tab(s) orally 2 times a day  finasteride 5 mg oral tablet: 1 tab(s) orally once a day  Flomax 0.4 mg oral capsule: 1 cap(s) orally once a day   glimepiride 1 mg oral tablet: 2 tab(s) orally once a day  HumaLOG 100 units/mL injectable solution: injectable once a day (after a meal)  ****Sliding Scale****  Lantus 100 units/mL subcutaneous solution: 13 unit(s) subcutaneous once a day  metoprolol succinate 25 mg oral tablet, extended release: 1 tab(s) orally once a day  omeprazole 40 mg oral delayed release capsule: 1 cap(s) orally once a day  Tradjenta 5 mg oral tablet: 1 tab(s) orally once a day  traMADol 50 mg oral tablet: 1 tab(s) orally every 6 hours, As needed, Moderate Pain (4 - 6) MDD:200mg  valsartan 160 mg oral tablet: 1 tab(s) orally once a day

## 2024-05-30 NOTE — INTERVAL H&P NOTE
Update History & Physical    The patient's History and Physical was reviewed with the patient and I examined the patient. There was no change. The surgical site was confirmed by the patient and me.     Plan: The risks, benefits, expected outcome, and alternative to the recommended procedure have been discussed with the patient. Patient understands and wants to proceed with the procedure.     Electronically signed by Damon Singh MD on 5/30/2024 at 9:06 AM

## 2024-05-30 NOTE — PROGRESS NOTES
1025  pt. Reacting, restless, disoriented and shivering hard on arrival to pacu.  Abdominal sites x 6 with steristrips and bandaids  intact and dry.  Abdominal binder intact.  1030  pt. medicated for shivering.  1040  pt. Becoming more awake and less shivering noted.  1050  pt. Awake and oriented.  Pt. Denies abdominal pain.  Pt. Complains of pain in neck, shoulders,  and feeling pressure when taking a deep breath.  Pt. Medicated for pain.  1100  pt. Anxious and continues to complain of chest pain when deep breathing.  O2 sat > 92%.  Pt. Denies S.O.B.  EKG without ectopy.  Dr. Zeina bishop.  Pt. Medicated for anxiety.  1115  pt. States he thinks the medication is starting to work.  Pt. Continues to deny abdominal pain.  1130  pt. Beginning to rest quietly for short periods of time.  1200  pacu criteria met.  Pt. Stable.  Pt. Transferred to [facility].

## 2024-05-30 NOTE — DISCHARGE INSTRUCTIONS
DR. RODRIGUEZ'S DISCHARGE INSTRUCTIONS    Pt Name: Chico Sanchez  Medical Record Number: [id number]  Today's Date: 5/30/2024    GENERAL ANESTHESIA OR SEDATION  1. Do not drive or operate hazardous machinery for 24 hours.  2. Do not make important business or personal decisions for 24 hours.  3. Do not drink alcoholic beverages or use tobacco for 24 hours.    ACTIVITY INSTRUCTIONS:  [] Rest today. Resume light to normal activity tomorrow.   [x] You may resume normal activity tomorrow. Do not engage in strenuous activity that may place stress on your incision.  [x] Do not drive for 3-5 days and avoid heavy lifting, tugging, pullings greater than 10-20 lbs until seen in the office.      DIET INSTRUCTIONS:  [x]Begin with clear liquids. If not nauseated, may increase to a low-fat diet when you desire. Greasy and spicy foods are not advised.  [x]Regular diet as tolerated.  []Other:     MEDICATIONS  [x]Prescription sent with you to be used as directed.   []Valium   [x]Norco   []Percocet   [x]Toradol   []Oxycontin     Do not drink alcohol or drive while taking these medications. You may experience dizziness or drowsiness with these medications. You may also experience constipation which can be relieved with stool softners or laxatives.    - Take Colace 100 mg 1-2 tabs daily as needed for constipation  - Take MiraLax 1-2 cap fulls daily as needed for constipation    [x]You may resume your daily prescription medication schedule unless otherwise specified.  []Do not take 325mg Aspirin or other blood thinners such as Coumadin or Plavix for 5 days.    **Pain medication at discharge - use only as prescribed- refills may be available to you at your follow up appointments if needed and warranted.  Narcotics should be used for only short term and we highly encouraged our patients to wean off appropriately and use other means for pain such as non pharmacologic measures and over the counter tylenol or ibuprofen if no restrictions

## 2024-05-30 NOTE — PROGRESS NOTES
Patient oriented to Same Day department and admitted to Same Day Surgery room 14.   Patient verbalized approval for first name, last initial with physician name on unit whiteboard.     Plan of care reviewed with patient.   Patient room whiteboard filled out and discussed with patient and responsible adult.   Patient and responsible adult offered Same Day Welcome Packet to review.    Call light in reach.   Bed in lowest position, locked, with one bed rail up.   SCDs and warming blanket in place.  Appropriate arm bands on patient.   Bathroom offered.   All questions and concerns of patient addressed.        Meds to Beds:   Patient informed of [facility] program during admission. Patient is agreeable to program.   Contact information for the pharmacy and the Meds to Beds program:          tito devlin (Parent)   [phone] (Mobile)

## 2024-06-02 DIAGNOSIS — K44.9 HIATAL HERNIA: Primary | ICD-10-CM

## 2024-06-02 RX ORDER — HYDROCODONE BITARTRATE AND ACETAMINOPHEN 5; 325 MG/1; MG/1
1-2 TABLET ORAL EVERY 6 HOURS PRN
Qty: 20 TABLET | Refills: 0 | Status: SHIPPED | OUTPATIENT
Start: 2024-06-02 | End: 2024-06-09

## 2024-06-03 ENCOUNTER — TELEPHONE (OUTPATIENT)
Dept: SURGERY | Age: 34
End: 2024-06-03

## 2024-06-03 DIAGNOSIS — K44.9 HIATAL HERNIA: Primary | ICD-10-CM

## 2024-06-03 NOTE — TELEPHONE ENCOUNTER
I called pt and he states he is doing better today.  He was having esophagus spasms which hurt worse than the surgery itself.  He did  his pain meds.  He states he is taking 3 tablets every 6 hours for pain due to the suboxone.

## 2024-06-03 NOTE — TELEPHONE ENCOUNTER
Per Dr. Singh, check to see how pt is doing.  He called over the weekend and was in pain.  Pain meds were sent to pharmacy.

## 2024-06-04 RX ORDER — DIAZEPAM 2 MG/1
2 TABLET ORAL EVERY 8 HOURS PRN
Qty: 15 TABLET | Refills: 0 | Status: SHIPPED | OUTPATIENT
Start: 2024-06-04 | End: 2024-06-14

## 2024-06-04 NOTE — ADDENDUM NOTE
Addended by: BRENDON RODRIGUEZ on: 6/4/2024 01:08 PM     Modules accepted: Orders    
matt all pertinent systems normal

## 2024-06-04 NOTE — TELEPHONE ENCOUNTER
Mom called and states pt is taking 3 norco every 6.  I told her he is to only take 2 norco every 6.  She states the 2 tablets do not help.  She states he did vomit last evening twice.  She states he is doing better, but needs refill on the norco.  She states this is the last time he is going to need this.  I advised her to use ice and move as tolerable to help w/pain.  He is still having the esophagus spasms, but they are better.  Will you fill the norco?

## 2024-07-08 ENCOUNTER — TELEPHONE (OUTPATIENT)
Dept: FAMILY MEDICINE CLINIC | Age: 34
End: 2024-07-08

## 2024-07-08 ENCOUNTER — HOSPITAL ENCOUNTER (OUTPATIENT)
Dept: MRI IMAGING | Age: 34
Discharge: HOME | End: 2024-07-08
Payer: MEDICAID

## 2024-07-08 DIAGNOSIS — H93.13 TINNITUS OF BOTH EARS: ICD-10-CM

## 2024-07-08 PROCEDURE — 6360000004 HC RX CONTRAST MEDICATION: Performed by: NURSE PRACTITIONER

## 2024-07-08 PROCEDURE — A9579 GAD-BASE MR CONTRAST NOS,1ML: HCPCS | Performed by: NURSE PRACTITIONER

## 2024-07-08 PROCEDURE — 70553 MRI BRAIN STEM W/O & W/DYE: CPT

## 2024-07-08 RX ADMIN — GADOTERIDOL 15 ML: 279.3 INJECTION, SOLUTION INTRAVENOUS at 12:31

## 2024-07-16 ENCOUNTER — OFFICE VISIT (OUTPATIENT)
Dept: FAMILY MEDICINE CLINIC | Age: 34
End: 2024-07-16
Payer: MEDICAID

## 2024-07-16 VITALS
SYSTOLIC BLOOD PRESSURE: 108 MMHG | OXYGEN SATURATION: 97 % | DIASTOLIC BLOOD PRESSURE: 62 MMHG | TEMPERATURE: 98.9 F | WEIGHT: 109.8 LBS | HEART RATE: 112 BPM | HEIGHT: 62 IN | BODY MASS INDEX: 20.2 KG/M2 | RESPIRATION RATE: 12 BRPM

## 2024-07-16 DIAGNOSIS — H93.13 TINNITUS OF BOTH EARS: ICD-10-CM

## 2024-07-16 DIAGNOSIS — F11.20 CONTINUOUS OPIOID DEPENDENCE (HCC): ICD-10-CM

## 2024-07-16 DIAGNOSIS — R53.83 OTHER FATIGUE: ICD-10-CM

## 2024-07-16 DIAGNOSIS — M25.50 ARTHRALGIA, UNSPECIFIED JOINT: ICD-10-CM

## 2024-07-16 DIAGNOSIS — K92.0 COFFEE GROUND EMESIS: ICD-10-CM

## 2024-07-16 DIAGNOSIS — N50.89 SCROTAL MASS: ICD-10-CM

## 2024-07-16 DIAGNOSIS — R63.4 UNINTENTIONAL WEIGHT LOSS: Primary | ICD-10-CM

## 2024-07-16 DIAGNOSIS — R11.0 NAUSEA: ICD-10-CM

## 2024-07-16 DIAGNOSIS — B18.2 CHRONIC HEPATITIS C WITHOUT HEPATIC COMA (HCC): ICD-10-CM

## 2024-07-16 PROCEDURE — 99214 OFFICE O/P EST MOD 30 MIN: CPT | Performed by: NURSE PRACTITIONER

## 2024-07-16 PROCEDURE — 4004F PT TOBACCO SCREEN RCVD TLK: CPT | Performed by: NURSE PRACTITIONER

## 2024-07-16 PROCEDURE — G8420 CALC BMI NORM PARAMETERS: HCPCS | Performed by: NURSE PRACTITIONER

## 2024-07-16 PROCEDURE — G8427 DOCREV CUR MEDS BY ELIG CLIN: HCPCS | Performed by: NURSE PRACTITIONER

## 2024-07-16 NOTE — PATIENT INSTRUCTIONS
Keep appointment for scrotal US  Follow up with GI, will call you with appt time  Get labs in next couple days- don't eat or drink for 8 hours prior to labs  Get chest xray in coming day  Follow up next week to discuss results.

## 2024-07-16 NOTE — PROGRESS NOTES
Chico Sanchez is a 34 y.o. male thatpresents for Other (Cyst in left groin area )      History obtained today from Patient.    Left groin mass, sometimes gets bigger and inflamed, but goes back to normal size but never goes away  Overall size has stayed normal   Does open and drain at times   Not currently inflamed  Just wanted to get it checked out   Had this for years    Unintentional weight loss  Supposed to be seeing GI for this  Not seen them recently    Vomiting coffee grounds???  Says he might be coughing it up after vaping  He isn't really sure   Says it happens at least daily    I have reviewed the patient's past medical history, past surgical history, allergies,medications, social and family history and I have made updates where appropriate.    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 No pertinent family history.      Review of Systems        PHYSICAL EXAM:  /62   Pulse (!) 112   Temp 98.9 °F (37.2 °C) (Oral)   Resp 12   Ht 1.575 m (5' 2\")   Wt 49.8 kg (109 lb 12.8 oz)   SpO2 97%   BMI 20.08 kg/m²     Physical Exam  Constitutional:       Appearance: Normal appearance.   HENT:      Head: Normocephalic and atraumatic.      Right Ear: External ear normal.      Left Ear: External ear normal.      Nose: Nose normal.      Mouth/Throat:      Mouth: Mucous membranes are moist.   Eyes:      Conjunctiva/sclera: Conjunctivae

## 2024-07-17 ENCOUNTER — HOSPITAL ENCOUNTER (OUTPATIENT)
Dept: ULTRASOUND IMAGING | Age: 34
Discharge: HOME | End: 2024-07-17
Payer: MEDICAID

## 2024-07-17 DIAGNOSIS — N50.89 SCROTAL MASS: ICD-10-CM

## 2024-07-17 PROCEDURE — 76870 US EXAM SCROTUM: CPT

## 2024-07-18 ENCOUNTER — TELEPHONE (OUTPATIENT)
Dept: FAMILY MEDICINE CLINIC | Age: 34
End: 2024-07-18

## 2024-07-18 DIAGNOSIS — N50.89 SCROTAL MASS: Primary | ICD-10-CM

## 2024-07-18 NOTE — TELEPHONE ENCOUNTER
----- Message from KATINA Floyd CNP sent at 7/18/2024 12:55 PM EDT -----  Mass doesn't appear solid but would still recommend referral to Urology.  Does he have a preference on who he sees?

## 2024-07-25 ENCOUNTER — OFFICE VISIT (OUTPATIENT)
Dept: FAMILY MEDICINE CLINIC | Age: 34
End: 2024-07-25
Payer: MEDICAID

## 2024-07-25 VITALS
SYSTOLIC BLOOD PRESSURE: 120 MMHG | RESPIRATION RATE: 14 BRPM | DIASTOLIC BLOOD PRESSURE: 80 MMHG | HEART RATE: 80 BPM | HEIGHT: 62 IN | WEIGHT: 113.2 LBS | BODY MASS INDEX: 20.83 KG/M2 | OXYGEN SATURATION: 96 % | TEMPERATURE: 98.8 F

## 2024-07-25 DIAGNOSIS — R63.4 UNINTENTIONAL WEIGHT LOSS: Primary | ICD-10-CM

## 2024-07-25 DIAGNOSIS — Z71.6 ENCOUNTER FOR SMOKING CESSATION COUNSELING: ICD-10-CM

## 2024-07-25 PROCEDURE — G8427 DOCREV CUR MEDS BY ELIG CLIN: HCPCS | Performed by: NURSE PRACTITIONER

## 2024-07-25 PROCEDURE — 4004F PT TOBACCO SCREEN RCVD TLK: CPT | Performed by: NURSE PRACTITIONER

## 2024-07-25 PROCEDURE — 99213 OFFICE O/P EST LOW 20 MIN: CPT | Performed by: NURSE PRACTITIONER

## 2024-07-25 PROCEDURE — G8420 CALC BMI NORM PARAMETERS: HCPCS | Performed by: NURSE PRACTITIONER

## 2024-07-25 NOTE — PROGRESS NOTES
Pulmonary:      Effort: Pulmonary effort is normal.      Breath sounds: Normal breath sounds.   Abdominal:      General: Bowel sounds are normal.      Palpations: Abdomen is soft.   Musculoskeletal:         General: Normal range of motion.      Cervical back: Normal range of motion.   Skin:     General: Skin is warm and dry.   Neurological:      General: No focal deficit present.      Mental Status: He is alert and oriented to person, place, and time.   Psychiatric:         Mood and Affect: Mood normal.         Behavior: Behavior normal.           ASSESSMENT & PLAN  Chico was seen today for follow-up.    Diagnoses and all orders for this visit:    Unintentional weight loss    Encounter for smoking cessation counseling      Get labs and cxr    Urology and GI appt in August     Discussed smoking cessation  Already has nicotine patches at home  Going to try and stop vaping     No follow-ups on file.        All copied or forwarded information in the progress note was verified by me to be accurate at the time of visit  Patient's past medical, surgical, social and family history were reviewed and updated     All patient questions answered.  Patient voiced understanding.     I spent 25 minutes reviewing previous notes and testing, discussing symptoms, medications and side effects, treatment options with the patient, performing an exam, and developing a plan of care.  All questions answered.  Patient verbalized understanding.

## 2024-09-05 ENCOUNTER — OFFICE VISIT (OUTPATIENT)
Dept: UROLOGY | Age: 34
End: 2024-09-05
Payer: MEDICAID

## 2024-09-05 VITALS — HEIGHT: 62 IN | RESPIRATION RATE: 18 BRPM | BODY MASS INDEX: 19.88 KG/M2 | WEIGHT: 108 LBS

## 2024-09-05 DIAGNOSIS — N50.812 PAIN IN LEFT TESTICLE: Primary | ICD-10-CM

## 2024-09-05 PROCEDURE — 99204 OFFICE O/P NEW MOD 45 MIN: CPT | Performed by: UROLOGY

## 2024-09-05 PROCEDURE — 4004F PT TOBACCO SCREEN RCVD TLK: CPT | Performed by: UROLOGY

## 2024-09-05 PROCEDURE — G8420 CALC BMI NORM PARAMETERS: HCPCS | Performed by: UROLOGY

## 2024-09-05 PROCEDURE — G8427 DOCREV CUR MEDS BY ELIG CLIN: HCPCS | Performed by: UROLOGY

## 2024-09-05 RX ORDER — SULFAMETHOXAZOLE/TRIMETHOPRIM 800-160 MG
1 TABLET ORAL 2 TIMES DAILY
Qty: 56 TABLET | Refills: 0 | Status: SHIPPED | OUTPATIENT
Start: 2024-09-05 | End: 2024-10-03

## 2024-09-05 NOTE — PROGRESS NOTES
8-2 MG FILM SL film Place 0.5 Film under the tongue daily.         Patient has no known allergies.  Social History     Tobacco Use   Smoking Status Every Day    Current packs/day: 0.50    Average packs/day: 0.5 packs/day for 5.0 years (2.5 ttl pk-yrs)    Types: Cigarettes   Smokeless Tobacco Never       Social History     Substance and Sexual Activity   Alcohol Use No       REVIEW OF SYSTEMS:  Constitutional: negative  Eyes: negative  Respiratory: negative  Cardiovascular: negative  Gastrointestinal: negative  Musculoskeletal: negative  Genitourinary: negative except for what is in HPI  Skin: negative   Neurological: negative  Hematological/Lymphatic: negative  Psychological: negative    Physical Exam:    This a 34 y.o. male   Vitals:    09/05/24 1305   Resp: 18     Constitutional: Patient in no acute distress;   Neuro: alert and oriented to person place and time.    Psych: Mood and affect normal.  Skin: Normal  Lungs: Respiratory effort normal  Cardiovascular:  Normal peripheral pulses  Abdomen: Soft, non-tender, non-distended   Bladder non-tender and not distended.  Lymphatics: no palpable lymphadenopathy  Gait is within normal limits  Musculoskeletal: Normal range of motion       Assessment and Plan      1. Pain in left testicle           Exam- could not feel a lesion  Has tenderness to palpation in left cord  Maybe EO-itis  Course of Abx  Repeat IDA  Prescriptions Ordered:  Orders Placed This Encounter   Medications    sulfamethoxazole-trimethoprim (BACTRIM DS) 800-160 MG per tablet     Sig: Take 1 tablet by mouth 2 times daily for 28 days     Dispense:  56 tablet     Refill:  0      Orders Placed:  Orders Placed This Encounter   Procedures    US SCROTUM AND TESTICLES     Please schedule with doppler     Standing Status:   Future     Standing Expiration Date:   8/31/2025            ARMANDO GHOSH MD    No follow-ups on file.      Armando Ghosh MD  [facility]

## 2024-10-03 LAB
ALBUMIN: 4.5 G/DL (ref 3.5–5.2)
ALK PHOSPHATASE: 65 U/L (ref 39–118)
ALT SERPL-CCNC: 10 U/L (ref 5–41)
ANION GAP SERPL CALCULATED.3IONS-SCNC: 14 MMOL/L (ref 7–16)
AST SERPL-CCNC: 17 U/L (ref 9–50)
BASOPHILS ABSOLUTE: 0.05 K/UL (ref 0–0.2)
BASOPHILS RELATIVE PERCENT: 1.1 % (ref 0–2)
BILIRUB SERPL-MCNC: 0.4 MG/DL
BUN BLDV-MCNC: 14 MG/DL (ref 6–20)
C-REACTIVE PROTEIN: 0.1 MG/DL
CALCIUM SERPL-MCNC: 9.7 MG/DL (ref 8.6–10.5)
CHLORIDE BLD-SCNC: 102 MMOL/L (ref 96–107)
CHOLESTEROL, TOTAL: 145 MG/DL (ref 100–199)
CHOLESTEROL/HDL RATIO: 2.8 (ref 2–4.5)
CO2: 25 MMOL/L (ref 18–32)
CREAT SERPL-MCNC: 1.01 MG/DL (ref 0.67–1.3)
EGFR IF NONAFRICAN AMERICAN: 99 ML/MIN/1.73M2
EOSINOPHILS ABSOLUTE: 0.02 K/UL (ref 0–0.8)
EOSINOPHILS RELATIVE PERCENT: 0.5 % (ref 0–5)
FOLLICLE STIMULATING HORMONE: 4.1 MIU/ML (ref 1.5–12.4)
GLUCOSE: 110 MG/DL (ref 70–100)
HCT VFR BLD CALC: 42.3 % (ref 39–52)
HDLC SERPL-MCNC: 52 MG/DL
HEMOGLOBIN: 13.8 G/DL (ref 13–18)
IMMATURE GRANS (ABS): 0.01 K/UL (ref 0–0.06)
IMMATURE GRANULOCYTES %: 0.2 % (ref 0–2)
IRON % SATURATION: 32 % (ref 13–45)
IRON: 95 UG/DL (ref 59–158)
LACTATE DEHYDROGENASE: 144 U/L (ref 135–225)
LDL CHOLESTEROL: 83 MG/DL
LDL/HDL RATIO: 1.6
LH: 3 MIU/ML (ref 1.7–8.6)
LYMPHOCYTES ABSOLUTE: 1.54 K/UL (ref 0.9–5.2)
LYMPHOCYTES RELATIVE PERCENT: 35.2 % (ref 20–45)
MCH RBC QN AUTO: 28.8 PG (ref 26–32)
MCHC RBC AUTO-ENTMCNC: 32.6 G/DL (ref 32–35)
MCV RBC AUTO: 88 FL (ref 75–100)
MONOCYTES ABSOLUTE: 0.24 K/UL (ref 0.1–1)
MONOCYTES RELATIVE PERCENT: 5.5 % (ref 0–13)
NEUTROPHILS ABSOLUTE: 2.52 K/UL (ref 1.9–8)
NEUTROPHILS RELATIVE PERCENT: 57.5 % (ref 45–75)
PDW BLD-RTO: 12.6 % (ref 11.2–14.8)
PLATELET # BLD: 266 THOUS/CMM (ref 140–440)
POTASSIUM SERPL-SCNC: 5.5 MMOL/L (ref 3.5–5.4)
RBC # BLD: 4.79 MILL/CMM (ref 4.4–6.1)
SED RATE, AUTOMATED: 4 MM/HR (ref 0–14)
SODIUM BLD-SCNC: 141 MMOL/L (ref 135–148)
TOTAL IRON BINDING CAPACITY: 296 UG/DL (ref 250–450)
TOTAL PROTEIN: 6.7 G/DL (ref 6–8.3)
TRIGL SERPL-MCNC: 48 MG/DL (ref 20–149)
UNSATURATED IRON BINDING CAPACITY: 201 UG/DL (ref 112–347)
VLDLC SERPL CALC-MCNC: 10 MG/DL
WBC # BLD: 4.4 THDS/CMM (ref 3.6–11)

## 2024-10-04 LAB
AFP: <2.5 NG/ML
FERRITIN: 129 NG/ML (ref 30–400)
FOLATE: 15.5 NG/ML
T4 FREE: 1.4 NG/DL (ref 0.8–1.9)
TSH SERPL DL<=0.05 MIU/L-ACNC: 0.68 UIU/ML (ref 0.27–4.2)
VITAMIN B-12: 667 PG/ML (ref 232–1245)
VITAMIN D 25-HYDROXY: 23.9 NG/ML (ref 30–100)

## 2024-10-06 DIAGNOSIS — E55.9 VITAMIN D DEFICIENCY: Primary | ICD-10-CM

## 2024-10-06 RX ORDER — ERGOCALCIFEROL 1.25 MG/1
50000 CAPSULE, LIQUID FILLED ORAL WEEKLY
Qty: 12 CAPSULE | Refills: 1 | Status: SHIPPED | OUTPATIENT
Start: 2024-10-06

## 2024-10-07 ENCOUNTER — TELEPHONE (OUTPATIENT)
Dept: FAMILY MEDICINE CLINIC | Age: 34
End: 2024-10-07

## 2024-10-07 DIAGNOSIS — F11.20 CONTINUOUS OPIOID DEPENDENCE (HCC): ICD-10-CM

## 2024-10-07 DIAGNOSIS — B19.20 HEPATITIS C TEST POSITIVE: ICD-10-CM

## 2024-10-07 DIAGNOSIS — R53.83 FATIGUE, UNSPECIFIED TYPE: ICD-10-CM

## 2024-10-07 DIAGNOSIS — M25.50 ARTHRALGIA, UNSPECIFIED JOINT: ICD-10-CM

## 2024-10-07 RX ORDER — GABAPENTIN 300 MG/1
300 CAPSULE ORAL 3 TIMES DAILY
Qty: 90 CAPSULE | Refills: 2 | Status: SHIPPED | OUTPATIENT
Start: 2024-10-07 | End: 2024-11-06

## 2024-10-07 NOTE — TELEPHONE ENCOUNTER
Recent Visits  Date Type Provider Dept   07/25/24 Office Visit Nubia Osborne, APRN - CNP [facility]   07/16/24 Office Visit Nubia Osborne, APRN - CNP [facility]   05/22/24 Office Visit Nubia Osborne, APRN - CNP [facility]   11/29/23 Office Visit Nubia Osborne, APRN - CNP [facility]   08/23/23 Office Visit Nubia Osborne, APRN - CNP [facility]   06/27/23 Office Visit Nubia Osborne, APRN - CNP [facility]   06/21/23 Office Visit Nubia Osborne, APRN - CNP [facility]   05/02/23 Office Visit Nubia Osborne, APRN - CNP [facility]   Showing recent visits within past 540 days with a meds authorizing provider and meeting all other requirements  Future Appointments  No visits were found meeting these conditions.  Showing future appointments within next 150 days with a meds authorizing provider and meeting all other requirements

## 2024-10-07 NOTE — TELEPHONE ENCOUNTER
Called patient and informed of message.   Patient verbalized understanding.   No questions or concerns at this time.     Lab order mailed to patient with instructions on when and how to complete

## 2024-10-07 NOTE — TELEPHONE ENCOUNTER
----- Message from KATINA Dent CNP sent at 10/6/2024  8:44 PM EDT -----  Labs were stable overall except Vit D was low.  Recommend we start weekly Vit D.  Will send rx in for this.  need to recheck Vit D level in 3 mos, order placed.

## 2024-10-15 ENCOUNTER — HOSPITAL ENCOUNTER (OUTPATIENT)
Dept: ULTRASOUND IMAGING | Age: 34
Discharge: HOME | End: 2024-10-15
Attending: UROLOGY
Payer: MEDICAID

## 2024-10-15 DIAGNOSIS — N50.812 PAIN IN LEFT TESTICLE: ICD-10-CM

## 2024-10-15 PROCEDURE — 76870 US EXAM SCROTUM: CPT

## 2024-10-24 ENCOUNTER — OFFICE VISIT (OUTPATIENT)
Dept: FAMILY MEDICINE CLINIC | Age: 34
End: 2024-10-24
Payer: MEDICAID

## 2024-10-24 VITALS
OXYGEN SATURATION: 98 % | SYSTOLIC BLOOD PRESSURE: 132 MMHG | BODY MASS INDEX: 20.83 KG/M2 | TEMPERATURE: 97.6 F | RESPIRATION RATE: 12 BRPM | DIASTOLIC BLOOD PRESSURE: 78 MMHG | HEIGHT: 62 IN | WEIGHT: 113.2 LBS | HEART RATE: 84 BPM

## 2024-10-24 DIAGNOSIS — R10.13 DYSPEPSIA: ICD-10-CM

## 2024-10-24 DIAGNOSIS — K92.0 COFFEE GROUND EMESIS: ICD-10-CM

## 2024-10-24 DIAGNOSIS — R63.4 UNINTENTIONAL WEIGHT LOSS: Primary | ICD-10-CM

## 2024-10-24 DIAGNOSIS — K21.9 GASTROESOPHAGEAL REFLUX DISEASE, UNSPECIFIED WHETHER ESOPHAGITIS PRESENT: ICD-10-CM

## 2024-10-24 DIAGNOSIS — E87.5 SERUM POTASSIUM ELEVATED: ICD-10-CM

## 2024-10-24 DIAGNOSIS — Z86.19 HX OF HEPATITIS C: ICD-10-CM

## 2024-10-24 DIAGNOSIS — R73.01 IFG (IMPAIRED FASTING GLUCOSE): ICD-10-CM

## 2024-10-24 PROCEDURE — G8427 DOCREV CUR MEDS BY ELIG CLIN: HCPCS | Performed by: NURSE PRACTITIONER

## 2024-10-24 PROCEDURE — 4004F PT TOBACCO SCREEN RCVD TLK: CPT | Performed by: NURSE PRACTITIONER

## 2024-10-24 PROCEDURE — 99214 OFFICE O/P EST MOD 30 MIN: CPT | Performed by: NURSE PRACTITIONER

## 2024-10-24 PROCEDURE — G8420 CALC BMI NORM PARAMETERS: HCPCS | Performed by: NURSE PRACTITIONER

## 2024-10-24 PROCEDURE — G8484 FLU IMMUNIZE NO ADMIN: HCPCS | Performed by: NURSE PRACTITIONER

## 2024-10-24 RX ORDER — SUCRALFATE ORAL 1 G/10ML
1 SUSPENSION ORAL 4 TIMES DAILY
Qty: 560 ML | Refills: 0 | Status: SHIPPED | OUTPATIENT
Start: 2024-10-24 | End: 2024-11-07

## 2024-10-24 RX ORDER — PANTOPRAZOLE SODIUM 40 MG/1
40 TABLET, DELAYED RELEASE ORAL DAILY
Qty: 90 TABLET | Refills: 3 | Status: SHIPPED | OUTPATIENT
Start: 2024-10-24

## 2024-10-24 SDOH — ECONOMIC STABILITY: FOOD INSECURITY: WITHIN THE PAST 12 MONTHS, YOU WORRIED THAT YOUR FOOD WOULD RUN OUT BEFORE YOU GOT MONEY TO BUY MORE.: NEVER TRUE

## 2024-10-24 SDOH — ECONOMIC STABILITY: FOOD INSECURITY: WITHIN THE PAST 12 MONTHS, THE FOOD YOU BOUGHT JUST DIDN'T LAST AND YOU DIDN'T HAVE MONEY TO GET MORE.: NEVER TRUE

## 2024-10-24 SDOH — ECONOMIC STABILITY: INCOME INSECURITY: HOW HARD IS IT FOR YOU TO PAY FOR THE VERY BASICS LIKE FOOD, HOUSING, MEDICAL CARE, AND HEATING?: NOT HARD AT ALL

## 2024-10-24 NOTE — PROGRESS NOTES
Chico Sanchez is a 34 y.o. male thatpresents for Follow-up (New referral for GI doctor )      History obtained today from Patient.    Wants new referral to GI  Appetite is a little better with Remeron but still having dyspepsia, vomiting  No abd pain today  No fever or chills, bloody stools or urine    I have reviewed the patient's past medical history, past surgical history, allergies,medications, social and family history and I have made updates where appropriate.    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 No pertinent family history.      Review of Systems        PHYSICAL EXAM:  /78   Pulse 84   Temp 97.6 °F (36.4 °C) (Oral)   Resp 12   Ht 1.575 m (5' 2\")   Wt 51.3 kg (113 lb 3.2 oz)   SpO2 98%   BMI 20.70 kg/m²     Physical Exam  Constitutional:       Appearance: Normal appearance.   HENT:      Head: Normocephalic and atraumatic.      Right Ear: External ear normal.      Left Ear: External ear normal.      Nose: Nose normal.      Mouth/Throat:      Mouth: Mucous membranes are moist.   Eyes:      Conjunctiva/sclera: Conjunctivae normal.   Cardiovascular:      Rate and Rhythm: Normal rate and regular rhythm.      Pulses: Normal pulses.      Heart sounds: Normal heart sounds.   Pulmonary:      Effort: Pulmonary effort is normal.      Breath sounds: Normal breath sounds.   Abdominal:      General: Bowel sounds are

## 2024-11-26 ENCOUNTER — TELEPHONE (OUTPATIENT)
Dept: INTERNAL MEDICINE CLINIC | Age: 34
End: 2024-11-26

## 2024-11-26 NOTE — TELEPHONE ENCOUNTER
Outpatient dietitian left msge with patient to callback and r/s RD appt that was missed today 11/26/24, @ 8 am. CB# and office hours provided.

## 2024-12-06 ENCOUNTER — TELEPHONE (OUTPATIENT)
Dept: FAMILY MEDICINE CLINIC | Age: 34
End: 2024-12-06

## 2024-12-06 NOTE — TELEPHONE ENCOUNTER
Pt informed of needing to get his labs done. Pt voiced understanding with no further questions at this time. He will go tomorrow and get them done.

## 2024-12-06 NOTE — TELEPHONE ENCOUNTER
Patient called stating he looked on his Mychart and it looks like his Glucose levels are high and he thinks he has diabetes so he was wondering what he needs to do about this because no one has said anything to him about this!

## 2024-12-06 NOTE — TELEPHONE ENCOUNTER
We did say something to him about this.  On 10/24 I ordered a recheck of a CMP and an A1c because of his elevated sugar.  Looks like he still hasn't gotten these done.  Needs to do them and fast overnight.

## 2024-12-24 DIAGNOSIS — F41.9 ANXIETY DISORDER, UNSPECIFIED TYPE: ICD-10-CM

## 2024-12-24 DIAGNOSIS — R11.0 NAUSEA: ICD-10-CM

## 2024-12-24 DIAGNOSIS — K21.9 GASTROESOPHAGEAL REFLUX DISEASE, UNSPECIFIED WHETHER ESOPHAGITIS PRESENT: ICD-10-CM

## 2024-12-24 RX ORDER — ONDANSETRON 4 MG/1
4 TABLET, FILM COATED ORAL EVERY 8 HOURS PRN
Qty: 30 TABLET | Refills: 3 | Status: SHIPPED | OUTPATIENT
Start: 2024-12-24

## 2024-12-24 RX ORDER — MIRTAZAPINE 30 MG/1
30 TABLET, FILM COATED ORAL NIGHTLY
Qty: 90 TABLET | Refills: 1 | Status: SHIPPED | OUTPATIENT
Start: 2024-12-24

## 2024-12-24 RX ORDER — FAMOTIDINE 20 MG/1
20 TABLET, FILM COATED ORAL 2 TIMES DAILY PRN
Qty: 180 TABLET | Refills: 3 | Status: SHIPPED | OUTPATIENT
Start: 2024-12-24

## 2024-12-24 NOTE — TELEPHONE ENCOUNTER
Recent Visits  Date Type Provider Dept   10/24/24 Office Visit Nubia Osborne, APRN - CNP [facility]   07/25/24 Office Visit Nubia Osborne, APRN - CNP [facility]   07/16/24 Office Visit Nubia Osborne, APRN - CNP [facility]   05/22/24 Office Visit Nubia Osborne, APRN - CNP [facility]   11/29/23 Office Visit Nubia Osborne, APRN - CNP [facility]   08/23/23 Office Visit Nubia Osborne, APRN - CNP [facility]   Showing recent visits within past 540 days with a meds authorizing provider and meeting all other requirements  Future Appointments  No visits were found meeting these conditions.  Showing future appointments within next 150 days with a meds authorizing provider and meeting all other requirements

## 2025-01-09 PROBLEM — B18.2 CHRONIC HEPATITIS C WITHOUT HEPATIC COMA (HCC): Status: RESOLVED | Noted: 2024-07-16 | Resolved: 2025-01-09

## 2025-01-17 DIAGNOSIS — F11.20 CONTINUOUS OPIOID DEPENDENCE (HCC): ICD-10-CM

## 2025-01-17 DIAGNOSIS — B19.20 HEPATITIS C TEST POSITIVE: ICD-10-CM

## 2025-01-17 DIAGNOSIS — M25.50 ARTHRALGIA, UNSPECIFIED JOINT: ICD-10-CM

## 2025-01-17 DIAGNOSIS — R53.83 FATIGUE, UNSPECIFIED TYPE: ICD-10-CM

## 2025-01-17 RX ORDER — GABAPENTIN 300 MG/1
300 CAPSULE ORAL 3 TIMES DAILY
Qty: 270 CAPSULE | Refills: 1 | Status: SHIPPED | OUTPATIENT
Start: 2025-01-17 | End: 2025-07-16

## 2025-01-22 ENCOUNTER — HOSPITAL ENCOUNTER (OUTPATIENT)
Dept: CT IMAGING | Age: 35
Discharge: HOME | End: 2025-01-22
Attending: INTERNAL MEDICINE
Payer: MEDICAID

## 2025-01-22 ENCOUNTER — OFFICE VISIT (OUTPATIENT)
Dept: PULMONOLOGY | Age: 35
End: 2025-01-22
Payer: MEDICAID

## 2025-01-22 VITALS
WEIGHT: 112.8 LBS | OXYGEN SATURATION: 96 % | BODY MASS INDEX: 20.76 KG/M2 | TEMPERATURE: 99.5 F | SYSTOLIC BLOOD PRESSURE: 126 MMHG | HEIGHT: 62 IN | HEART RATE: 88 BPM | DIASTOLIC BLOOD PRESSURE: 84 MMHG

## 2025-01-22 DIAGNOSIS — R63.4 UNINTENTIONAL WEIGHT LOSS: ICD-10-CM

## 2025-01-22 DIAGNOSIS — R05.3 CHRONIC COUGH: Primary | ICD-10-CM

## 2025-01-22 DIAGNOSIS — Z72.0 TOBACCO ABUSE: ICD-10-CM

## 2025-01-22 DIAGNOSIS — R05.3 CHRONIC COUGH: ICD-10-CM

## 2025-01-22 DIAGNOSIS — R04.2 HEMOPTYSIS: ICD-10-CM

## 2025-01-22 PROCEDURE — G8420 CALC BMI NORM PARAMETERS: HCPCS | Performed by: INTERNAL MEDICINE

## 2025-01-22 PROCEDURE — 71275 CT ANGIOGRAPHY CHEST: CPT

## 2025-01-22 PROCEDURE — 6360000004 HC RX CONTRAST MEDICATION: Performed by: INTERNAL MEDICINE

## 2025-01-22 PROCEDURE — G8427 DOCREV CUR MEDS BY ELIG CLIN: HCPCS | Performed by: INTERNAL MEDICINE

## 2025-01-22 PROCEDURE — 99205 OFFICE O/P NEW HI 60 MIN: CPT | Performed by: INTERNAL MEDICINE

## 2025-01-22 PROCEDURE — 4004F PT TOBACCO SCREEN RCVD TLK: CPT | Performed by: INTERNAL MEDICINE

## 2025-01-22 RX ORDER — IOPAMIDOL 755 MG/ML
80 INJECTION, SOLUTION INTRAVASCULAR
Status: COMPLETED | OUTPATIENT
Start: 2025-01-22 | End: 2025-01-22

## 2025-01-22 RX ADMIN — IOPAMIDOL 80 ML: 755 INJECTION, SOLUTION INTRAVENOUS at 10:47

## 2025-01-22 ASSESSMENT — ENCOUNTER SYMPTOMS
SHORTNESS OF BREATH: 0
HEARTBURN: 1
COUGH: 1
WHEEZING: 1
HEMOPTYSIS: 1

## 2025-01-22 NOTE — PROGRESS NOTES
[facility]    Patient: WALKER BRAND, 34 y.o.   : 1990         Subjective     Chief Complaint   Patient presents with    New Patient     Pulm consult. Ref melissa jenkins. Hemoptysis, cough, tobacco use. No recent imaging or testing .         Cough  This is a chronic problem. The current episode started more than 1 year ago (4-5 year, start getting dark colored for couple of years). The problem has been unchanged. The cough is Productive of bloody sputum. Associated symptoms include chest pain (sharp stabing pain), chills (night sweats), a fever, heartburn, hemoptysis, nasal congestion, postnasal drip, weight loss and wheezing (wheezes at night). Pertinent negatives include no rash or shortness of breath. The symptoms are aggravated by fumes. Risk factors for lung disease include smoking/tobacco exposure. He has tried a beta-agonist inhaler for the symptoms. There is no history of asthma or pneumonia. HCV in past. Gets tested every 6 months for HIV, neg.   Used to get hospitalized with flu a lot when younger.     Patient has been admitted or treated for pneumonia, pulmonary embolism, or respiratory failure.    Social History  Patient job history included not working cause of health.   Patient has had exposure to aerosolized particles or hazardous fumes. (Coal, dust, asbestos, molds ie Hay) asbestos.   denies living on a farm that primarily raised crops, livestock or combination  has passive tobacco exposure from parents or work environment.  admits to to active tobacco smoking 0.5 PPD for 23 years for 12 pack years   denies exposure to pets/animals at home.  has exposure to tuberculosis.    Flu vaccine?    Pneumonia vaccine? no  Covid? 1 covid  RSV? no  Past Medical hx   PMH:  Past Medical History:   Diagnosis Date    Anxiety     Depression     GERD (gastroesophageal reflux disease)     Hepatitis C      SURGICAL HISTORY:  Past Surgical History:   Procedure Laterality Date

## 2025-01-23 ENCOUNTER — ANESTHESIA EVENT (OUTPATIENT)
Dept: ENDOSCOPY | Age: 35
End: 2025-01-23
Payer: MEDICAID

## 2025-01-23 ENCOUNTER — ANESTHESIA (OUTPATIENT)
Dept: ENDOSCOPY | Age: 35
End: 2025-01-23
Payer: MEDICAID

## 2025-01-23 ENCOUNTER — HOSPITAL ENCOUNTER (OUTPATIENT)
Age: 35
Setting detail: OUTPATIENT SURGERY
Discharge: HOME | End: 2025-01-23
Attending: INTERNAL MEDICINE | Admitting: INTERNAL MEDICINE
Payer: MEDICAID

## 2025-01-23 ENCOUNTER — APPOINTMENT (OUTPATIENT)
Dept: ENDOSCOPY | Age: 35
End: 2025-01-23
Attending: INTERNAL MEDICINE
Payer: MEDICAID

## 2025-01-23 VITALS
TEMPERATURE: 97.2 F | HEART RATE: 99 BPM | DIASTOLIC BLOOD PRESSURE: 86 MMHG | RESPIRATION RATE: 16 BRPM | OXYGEN SATURATION: 100 % | SYSTOLIC BLOOD PRESSURE: 110 MMHG

## 2025-01-23 LAB
ACINETOBACTER CALCOACETICUS-BAUMANNII BY PCR: NOT DETECTED
BLACTX-M ISLT/SPM QL: NORMAL
BLAIMP ISLT/SPM QL: NORMAL
BLAKPC ISLT/SPM QL: NORMAL
BLAOXA-48-LIKE ISLT/SPM QL: NORMAL
BLAVIM ISLT/SPM QL: NORMAL
C PNEUM DNA LOWER RESP QL NAA+NON-PROBE: NOT DETECTED
CYTOLOGY-NON GYN: NORMAL
ENTEROBACTER CLOACAE COMPLEX BY PCR: NOT DETECTED
ESCHERICHIA COLI BY PCR: NOT DETECTED
FLUAV RNA LOWER RESP QL NAA+NON-PROBE: NOT DETECTED
FLUBV RNA LOWER RESP QL NAA+NON-PROBE: NOT DETECTED
FUNGUS SPEC FUNGUS STN: NORMAL
HADV DNA LOWER RESP QL NAA+NON-PROBE: NOT DETECTED
HAEMOPHILUS INFLUENZAE BY PCR: NOT DETECTED
HCOV RNA LOWER RESP QL NAA+NON-PROBE: NOT DETECTED
HMPV RNA LOWER RESP QL NAA+NON-PROBE: NOT DETECTED
HPIV RNA LOWER RESP QL NAA+NON-PROBE: NOT DETECTED
KLEBSIELLA AEROGENES BY PCR: NOT DETECTED
KLEBSIELLA OXYTOCA BY PCR: NOT DETECTED
KLEBSIELLA PNEUMONIAE GROUP BY PCR: NOT DETECTED
L PNEUMO DNA LOWER RESP QL NAA+NON-PROBE: NOT DETECTED
M PNEUMO DNA LOWER RESP QL NAA+NON-PROBE: NOT DETECTED
MORAXELLA CATARRHALIS BY PCR: NOT DETECTED
PROTEUS SPECIES BY PCR: NOT DETECTED
PSEUDOMONAS AERUGINOSA BY PCR: NOT DETECTED
RESISTANT GENE MECA/C & MREJ BY PCR: NORMAL
RESISTANT GENE NDM BY PCR: NORMAL
RSV RNA LOWER RESP QL NAA+NON-PROBE: NOT DETECTED
RV+EV RNA LOWER RESP QL NAA+NON-PROBE: NOT DETECTED
SERRATIA MARCESCENS BY PCR: NOT DETECTED
SOURCE: NORMAL
SPECIMEN ACCEPTABILITY: NORMAL
STAPH AUREUS BY PCR: NOT DETECTED
STREP AGALACTIAE BY PCR: NOT DETECTED
STREP PNEUMONIAE BY PCR: NOT DETECTED
STREP PYOGENES BY PCR: NOT DETECTED

## 2025-01-23 PROCEDURE — 2500000003 HC RX 250 WO HCPCS: Performed by: REGISTERED NURSE

## 2025-01-23 PROCEDURE — 7100000001 HC PACU RECOVERY - ADDTL 15 MIN: Performed by: INTERNAL MEDICINE

## 2025-01-23 PROCEDURE — 7100000000 HC PACU RECOVERY - FIRST 15 MIN: Performed by: INTERNAL MEDICINE

## 2025-01-23 PROCEDURE — 2580000003 HC RX 258: Performed by: INTERNAL MEDICINE

## 2025-01-23 PROCEDURE — 7100000011 HC PHASE II RECOVERY - ADDTL 15 MIN: Performed by: INTERNAL MEDICINE

## 2025-01-23 PROCEDURE — 3609027000 HC BRONCHOSCOPY: Performed by: INTERNAL MEDICINE

## 2025-01-23 PROCEDURE — 3700000000 HC ANESTHESIA ATTENDED CARE: Performed by: INTERNAL MEDICINE

## 2025-01-23 PROCEDURE — 6360000002 HC RX W HCPCS: Performed by: REGISTERED NURSE

## 2025-01-23 PROCEDURE — C1601 HC ENDOSCOPE, PULM, IMAGING/ILLUMINATION DEVICE: HCPCS | Performed by: INTERNAL MEDICINE

## 2025-01-23 PROCEDURE — 7100000010 HC PHASE II RECOVERY - FIRST 15 MIN: Performed by: INTERNAL MEDICINE

## 2025-01-23 PROCEDURE — 3700000001 HC ADD 15 MINUTES (ANESTHESIA): Performed by: INTERNAL MEDICINE

## 2025-01-23 RX ORDER — LIDOCAINE HYDROCHLORIDE 20 MG/ML
INJECTION, SOLUTION EPIDURAL; INFILTRATION; INTRACAUDAL; PERINEURAL
Status: DISCONTINUED | OUTPATIENT
Start: 2025-01-23 | End: 2025-01-23 | Stop reason: SDUPTHER

## 2025-01-23 RX ORDER — SODIUM CHLORIDE 9 MG/ML
INJECTION, SOLUTION INTRAVENOUS CONTINUOUS
Status: DISCONTINUED | OUTPATIENT
Start: 2025-01-23 | End: 2025-01-23 | Stop reason: HOSPADM

## 2025-01-23 RX ORDER — ROCURONIUM BROMIDE 10 MG/ML
INJECTION, SOLUTION INTRAVENOUS
Status: DISCONTINUED | OUTPATIENT
Start: 2025-01-23 | End: 2025-01-23 | Stop reason: SDUPTHER

## 2025-01-23 RX ORDER — ONDANSETRON 2 MG/ML
INJECTION INTRAMUSCULAR; INTRAVENOUS
Status: DISCONTINUED | OUTPATIENT
Start: 2025-01-23 | End: 2025-01-23 | Stop reason: SDUPTHER

## 2025-01-23 RX ORDER — DEXAMETHASONE SODIUM PHOSPHATE 4 MG/ML
INJECTION, SOLUTION INTRA-ARTICULAR; INTRALESIONAL; INTRAMUSCULAR; INTRAVENOUS; SOFT TISSUE
Status: DISCONTINUED | OUTPATIENT
Start: 2025-01-23 | End: 2025-01-23 | Stop reason: SDUPTHER

## 2025-01-23 RX ORDER — PROPOFOL 10 MG/ML
INJECTION, EMULSION INTRAVENOUS
Status: DISCONTINUED | OUTPATIENT
Start: 2025-01-23 | End: 2025-01-23 | Stop reason: SDUPTHER

## 2025-01-23 RX ORDER — MIDAZOLAM HYDROCHLORIDE 1 MG/ML
INJECTION, SOLUTION INTRAMUSCULAR; INTRAVENOUS
Status: DISCONTINUED | OUTPATIENT
Start: 2025-01-23 | End: 2025-01-23 | Stop reason: SDUPTHER

## 2025-01-23 RX ADMIN — MIDAZOLAM 2 MG: 1 INJECTION INTRAMUSCULAR; INTRAVENOUS at 11:48

## 2025-01-23 RX ADMIN — Medication 25 MG: at 11:57

## 2025-01-23 RX ADMIN — SUGAMMADEX 200 MG: 100 INJECTION, SOLUTION INTRAVENOUS at 12:04

## 2025-01-23 RX ADMIN — Medication 25 MG: at 11:51

## 2025-01-23 RX ADMIN — ONDANSETRON 4 MG: 2 INJECTION INTRAMUSCULAR; INTRAVENOUS at 11:51

## 2025-01-23 RX ADMIN — ROCURONIUM BROMIDE 30 MG: 10 INJECTION INTRAVENOUS at 11:51

## 2025-01-23 RX ADMIN — PROPOFOL 200 MG: 10 INJECTION, EMULSION INTRAVENOUS at 11:51

## 2025-01-23 RX ADMIN — SODIUM CHLORIDE: 9 INJECTION, SOLUTION INTRAVENOUS at 11:32

## 2025-01-23 RX ADMIN — SUGAMMADEX 200 MG: 100 INJECTION, SOLUTION INTRAVENOUS at 12:11

## 2025-01-23 RX ADMIN — DEXAMETHASONE SODIUM PHOSPHATE 10 MG: 4 INJECTION, SOLUTION INTRAMUSCULAR; INTRAVENOUS at 11:51

## 2025-01-23 RX ADMIN — LIDOCAINE HYDROCHLORIDE 100 MG: 20 INJECTION, SOLUTION EPIDURAL; INFILTRATION; INTRACAUDAL; PERINEURAL at 11:51

## 2025-01-23 ASSESSMENT — LIFESTYLE VARIABLES: SMOKING_STATUS: 1

## 2025-01-23 ASSESSMENT — ENCOUNTER SYMPTOMS
COUGH: 1
SHORTNESS OF BREATH: 1
SHORTNESS OF BREATH: 1

## 2025-01-23 ASSESSMENT — PAIN - FUNCTIONAL ASSESSMENT
PAIN_FUNCTIONAL_ASSESSMENT: NONE - DENIES PAIN
PAIN_FUNCTIONAL_ASSESSMENT: ADULT NONVERBAL PAIN SCALE (NPVS)
PAIN_FUNCTIONAL_ASSESSMENT: NONE - DENIES PAIN

## 2025-01-23 NOTE — PROGRESS NOTES
Bronchoscopy completed. Tolerated well. Photos taken. No biopsies. BAL obtained. One specimen jar labeled and sent to lab. Ambu 5.6 scope used.

## 2025-01-23 NOTE — PROGRESS NOTES
Recovery mode. Phase II. Patient denies discomfort. Passing gas. Pt taking fluids. Dr. Swanson discussed findings with patient and girlfriend. Discharge instructions provided and understanding verbalized.

## 2025-01-23 NOTE — PROCEDURES
PROCEDURE NOTE  Date: 1/23/2025   Name: Chico Sanchez  YOB: 1990    Bronchoscopy Procedure Note    Date of Operation: 1/23/2025    Pre-op Diagnosis: night sweats, hemoptysis, fevers, cough    Post-op Diagnosis: same    Surgeon: Pavan Swanson MD    Anesthesia: See anesthesia note    Operation: Flexible fiberoptic bronchoscopy, diagnostic BAL    Estimated Blood Loss: Minimal    Complications: none    Indications and History:  The patient is a 34 y.o. male with night sweats, fevers, and reported hemoptysis.  The risks, benefits, complications, treatment options and expected outcomes were discussed with the patient.  The possibilities of reaction to medication, pulmonary aspiration, perforation of a viscus, bleeding, failure to diagnose a condition and creating a complication requiring transfusion or operation were discussed with the patient who freely signed the consent.      Description of Procedure:  The patient was taken to endoscopy suite, identified as Chico Sanchez and the procedure verified as Flexible Fiberoptic Bronchoscopy.  A Time Out was held and the above information confirmed.     After anesthesia see their note, the bronchoscope was passed through ETT. The scope was then passed into the trachea.  Careful inspection of the tracheal lumen was accomplished. The scope was sequentially passed into the left main and then left upper and lower bronchi and segmental bronchi. The scope was then withdrawn and advanced into the right main bronchus and then into the RUL, RML, and RLL bronchi and segmental bronchi.     BAL was performed in the L     Endobronchial findings:   Trachea: Normal mucosa  Nicole: Normal mucosa  Right main bronchus: Normal mucosa  Right upper lobe bronchus: Normal mucosa  Right Middle lobe bronchus: some mucus , clear  Right Lower lobe bronchus: some mucus, clear  Left main bronchus: Normal mucosa  Left upper lobe bronchus: Normal mucosa  Left lower lobe bronchus: Normal

## 2025-01-23 NOTE — PROGRESS NOTES
1212: Pt arrives to pacu, responds to verbal stimuli and immediately drifts back to sleep. Pt on NC with npa in place, respirations easy and unlabored. VSS    1225: Pt awakens to verbal stimuli, npa removed. Tolerated well    1235: Pt resting off and on with eyes closed, easily awakens to voice. Denies pain    1242: Pt meets criteria for discharge from pacu, transported back to endo in stable condition. VSS

## 2025-01-23 NOTE — H&P
History and Physical     Name:Chico Sanchez  Medical Record Number:[id number]  Account Number: [id number]  Age: 34 y.o.      CHIEF COMPLAINT / HPI:                The patient is a 34 y.o. male with significant past medical history of   Patient Active Problem List   Diagnosis    Continuous opioid dependence (HCC)    Anxiety disorder    Hiatal hernia    Gastroesophageal reflux disease    Tobacco abuse    Unintentional weight loss    Hemoptysis    Chronic cough      presents with complaints of cough with hemoptysis and night sweats.     Past Medical History:      Diagnosis Date    Anxiety     Depression     GERD (gastroesophageal reflux disease)     Hepatitis C         Past Surgical History:        Procedure Laterality Date    COLONOSCOPY  10/24/2022    HIATAL HERNIA REPAIR N/A 5/30/2024    Robot Hiatal Hernia Repair with Linx performed by Damon Singh MD at [facility] OR    MOUTH SURGERY      at 16 yr of age    UPPER GASTROINTESTINAL ENDOSCOPY N/A 03/11/2024    EGD Bravo performed by Ismael Penny DO at [facility] ENDOSCOPY       Current Medications:        Allergies:  No Known Allergies    Social History:    TOBACCO:   reports that he has been smoking cigarettes. He started smoking about 23 years ago. He has a 11.5 pack-year smoking history. He has never used smokeless tobacco.  ETOH:   reports no history of alcohol use.    Family History:       Problem Relation Age of Onset    Lung Cancer Maternal Grandfather     Lung Cancer Paternal Grandfather     Colon Cancer Neg Hx     Colon Polyps Neg Hx            REVIEW OF SYSTEMS:    Review of Systems   Constitutional:  Negative for unexpected weight change.   Eyes:  Negative for visual disturbance.   Respiratory:  Positive for cough and shortness of breath.    Cardiovascular:  Negative for chest pain, palpitations and leg swelling.           Objective:   PHYSICAL EXAM:      VITALS:  There were no vitals taken for this visit.  24HR INTAKE/OUTPUT:  No intake or output data

## 2025-01-23 NOTE — ANESTHESIA PRE PROCEDURE
Department of Anesthesiology  Preprocedure Note       Name:  Chico Sanchez   Age:  34 y.o.  :  1990                                          MRN:  [id number]         Date:  2025      Surgeon: Surgeon(s):  Pavan Swanson MD    Procedure: Procedure(s):  BRONCHOSCOPY BAL    Medications prior to admission:   Prior to Admission medications    Medication Sig Start Date End Date Taking? Authorizing Provider   polyethylene glycol (GLYCOLAX) 17 GM/SCOOP powder Dispense 238 Gram Bottle.  Use as Directed 25  Yes Mary Arellano APRN - CNP   mirtazapine (REMERON) 30 MG tablet Take 1 tablet by mouth nightly 24  Yes Roger Abraham APRN - CNP   famotidine (PEPCID) 20 MG tablet Take 1 tablet by mouth 2 times daily as needed (acid reflux) 24  Yes Roger Abraham APRN - CNP   ondansetron (ZOFRAN) 4 MG tablet Take 1 tablet by mouth every 8 hours as needed for Nausea or Vomiting 24  Yes Roger Abraham APRN - CNP   propranolol (INDERAL) 20 MG tablet Take 1 tablet by mouth 24  Yes ProviderJon MD   pantoprazole (PROTONIX) 40 MG tablet Take 1 tablet by mouth every morning (before breakfast) 24  Yes Mary Arellano APRN - CNP   sucralfate (CARAFATE) 1 GM/10ML suspension Take 10 mLs by mouth 4 times daily for 14 days 10/24/24 1/23/25 Yes Nubia Osborne APRN - CNP   vitamin D (ERGOCALCIFEROL) 1.25 MG (98918 UT) CAPS capsule Take 1 capsule by mouth once a week 10/6/24  Yes Nubia Osborne APRN - CNP   fluticasone (FLONASE) 50 MCG/ACT nasal spray 2 sprays by Each Nostril route daily 24  Yes Nubia Osborne APRN - CNP   buprenorphine-naloxone (SUBOXONE) 8-2 MG FILM SL film Place 0.5 Film under the tongue daily.   Yes ProviderJon MD   gabapentin (NEURONTIN) 300 MG capsule Take 1 capsule by mouth 3 times daily for 180 days. 25  Nubia Osborne, APRN - CNP   bisacodyl (DULCOLAX) 5 MG EC tablet See Prep Instructions 25   Mary Arellano,

## 2025-01-24 LAB
AFB CULTURE & SMEAR: NORMAL
BAL CHARACTER: CLEAR
BAL COLLECTION SITE: NORMAL
BAL COLOR: COLORLESS
MISC. #1 REFERENCE GROUP TEST: NORMAL
PATHOLOGIST REVIEW: NORMAL
RBC BAL: 4 /CUMM
TOTAL NUCLEATED CELLS BAL: 87 /CUMM
TOTAL VOLUME RECEIVED BAL: 43 ML

## 2025-01-24 NOTE — ANESTHESIA POSTPROCEDURE EVALUATION
Department of Anesthesiology  Postprocedure Note    Patient: Chico Sanchez  MRN: [id number]  YOB: 1990  Date of evaluation: 1/24/2025    Procedure Summary       Date: 01/23/25 Room / Location: [place] [address] / [facility]    Anesthesia Start: 1148 Anesthesia Stop: 1220    Procedure: BRONCHOSCOPY BAL Diagnosis:       Chronic cough      (Chronic cough [R05.3])    Surgeons: Pavan Swanson MD Responsible Provider: Prabhakar Ordaz DO    Anesthesia Type: Not recorded ASA Status: 2            Anesthesia Type: No value filed.    Mandeep Phase I: Mandeep Score: 10    Mandeep Phase II: Mandeep Score: 10    Anesthesia Post Evaluation    Patient location during evaluation: PACU  Patient participation: complete - patient participated  Level of consciousness: awake and alert  Pain score: 3  Airway patency: patent  Nausea & Vomiting: no nausea and no vomiting  Cardiovascular status: hemodynamically stable and blood pressure returned to baseline  Respiratory status: spontaneous ventilation, room air and acceptable  Hydration status: stable  Pain management: adequate and satisfactory to patient        No notable events documented.

## 2025-01-24 NOTE — ANESTHESIA PRE PROCEDURE
Department of Anesthesiology  Preprocedure Note       Name:  Chico Sanchez   Age:  34 y.o.  :  1990                                          MRN:  [id number]         Date:  2025      Surgeon: Surgeon(s):  Pavan Swanson MD    Procedure: Procedure(s):  BRONCHOSCOPY BAL    Medications prior to admission:   Prior to Admission medications    Medication Sig Start Date End Date Taking? Authorizing Provider   polyethylene glycol (GLYCOLAX) 17 GM/SCOOP powder Dispense 238 Gram Bottle.  Use as Directed 25  Yes Mary Arellano APRN - CNP   mirtazapine (REMERON) 30 MG tablet Take 1 tablet by mouth nightly 24  Yes Roger Abraham APRN - CNP   famotidine (PEPCID) 20 MG tablet Take 1 tablet by mouth 2 times daily as needed (acid reflux) 24  Yes Roger Abraham APRN - CNP   ondansetron (ZOFRAN) 4 MG tablet Take 1 tablet by mouth every 8 hours as needed for Nausea or Vomiting 24  Yes Roger Abraham APRN - CNP   propranolol (INDERAL) 20 MG tablet Take 1 tablet by mouth 24  Yes ProviderJon MD   pantoprazole (PROTONIX) 40 MG tablet Take 1 tablet by mouth every morning (before breakfast) 24  Yes Mary Arellano APRN - CNP   sucralfate (CARAFATE) 1 GM/10ML suspension Take 10 mLs by mouth 4 times daily for 14 days 10/24/24 1/23/25 Yes Nubia Osborne APRN - CNP   vitamin D (ERGOCALCIFEROL) 1.25 MG (25037 UT) CAPS capsule Take 1 capsule by mouth once a week 10/6/24  Yes Nubia Osborne APRN - CNP   fluticasone (FLONASE) 50 MCG/ACT nasal spray 2 sprays by Each Nostril route daily 24  Yes Nubia Osborne APRN - CNP   buprenorphine-naloxone (SUBOXONE) 8-2 MG FILM SL film Place 0.5 Film under the tongue daily.   Yes ProviderJon MD   gabapentin (NEURONTIN) 300 MG capsule Take 1 capsule by mouth 3 times daily for 180 days. 25  Nubia Osborne, APRN - CNP   bisacodyl (DULCOLAX) 5 MG EC tablet See Prep Instructions 25   Mary Arellano,

## 2025-01-25 LAB
ACID FAST MOD KINY STN SPEC: NORMAL
BACTERIA SPEC RESP CULT: NORMAL
GALACTOMANNAN AG BAL QL: NEGATIVE
GALACTOMANNAN AG SPEC IA-ACNC: 0.06
GRAM STN SPEC: NORMAL
HSV1 DNA SPEC QL NAA+PROBE: NOT DETECTED
HSV2 DNA SPEC QL NAA+PROBE: NOT DETECTED
SPECIMEN SOURCE: NORMAL

## 2025-01-26 LAB — CMV DNA SPEC QL NAA+PROBE: NOT DETECTED

## 2025-01-27 LAB
AFB CULTURE & SMEAR: NORMAL
FUNGUS SPEC CULT: NORMAL
FUNGUS SPEC FUNGUS STN: NORMAL

## 2025-02-03 ENCOUNTER — LAB (OUTPATIENT)
Dept: LAB | Age: 35
End: 2025-02-03

## 2025-02-03 ENCOUNTER — TELEPHONE (OUTPATIENT)
Dept: FAMILY MEDICINE CLINIC | Age: 35
End: 2025-02-03

## 2025-02-03 DIAGNOSIS — R53.83 OTHER FATIGUE: ICD-10-CM

## 2025-02-03 DIAGNOSIS — R73.01 IFG (IMPAIRED FASTING GLUCOSE): ICD-10-CM

## 2025-02-03 DIAGNOSIS — E55.9 VITAMIN D DEFICIENCY: ICD-10-CM

## 2025-02-03 DIAGNOSIS — B18.2 CHRONIC HEPATITIS C WITHOUT HEPATIC COMA (HCC): ICD-10-CM

## 2025-02-03 DIAGNOSIS — F11.20 CONTINUOUS OPIOID DEPENDENCE (HCC): ICD-10-CM

## 2025-02-03 DIAGNOSIS — R63.4 UNINTENTIONAL WEIGHT LOSS: ICD-10-CM

## 2025-02-03 DIAGNOSIS — K92.0 COFFEE GROUND EMESIS: ICD-10-CM

## 2025-02-03 DIAGNOSIS — R73.09 ELEVATED GLUCOSE: Primary | ICD-10-CM

## 2025-02-03 DIAGNOSIS — N50.89 SCROTAL MASS: ICD-10-CM

## 2025-02-03 DIAGNOSIS — H93.13 TINNITUS OF BOTH EARS: ICD-10-CM

## 2025-02-03 DIAGNOSIS — M25.50 ARTHRALGIA, UNSPECIFIED JOINT: ICD-10-CM

## 2025-02-03 DIAGNOSIS — R11.0 NAUSEA: ICD-10-CM

## 2025-02-03 DIAGNOSIS — E87.5 SERUM POTASSIUM ELEVATED: ICD-10-CM

## 2025-02-03 LAB
25(OH)D3 SERPL-MCNC: 69 NG/ML (ref 30–100)
AFB CULTURE & SMEAR: NORMAL
ALBUMIN SERPL BCG-MCNC: 4.4 G/DL (ref 3.5–5.1)
ALP SERPL-CCNC: 78 U/L (ref 38–126)
ALT SERPL W/O P-5'-P-CCNC: 12 U/L (ref 11–66)
ANION GAP SERPL CALC-SCNC: 12 MEQ/L (ref 8–16)
AST SERPL-CCNC: 28 U/L (ref 5–40)
BASOPHILS ABSOLUTE: 0.1 THOU/MM3 (ref 0–0.1)
BASOPHILS NFR BLD AUTO: 0.9 %
BILIRUB SERPL-MCNC: 0.4 MG/DL (ref 0.3–1.2)
BUN SERPL-MCNC: 19 MG/DL (ref 7–22)
CALCIUM SERPL-MCNC: 9.6 MG/DL (ref 8.5–10.5)
CHLORIDE SERPL-SCNC: 103 MEQ/L (ref 98–111)
CHOLEST SERPL-MCNC: 145 MG/DL (ref 100–199)
CO2 SERPL-SCNC: 26 MEQ/L (ref 23–33)
CREAT SERPL-MCNC: 0.8 MG/DL (ref 0.4–1.2)
CRP SERPL-MCNC: < 0.3 MG/DL (ref 0–1)
DEPRECATED MEAN GLUCOSE BLD GHB EST-ACNC: 105 MG/DL (ref 70–126)
DEPRECATED RDW RBC AUTO: 44.5 FL (ref 35–45)
EOSINOPHIL NFR BLD AUTO: 2.2 %
EOSINOPHILS ABSOLUTE: 0.2 THOU/MM3 (ref 0–0.4)
ERYTHROCYTE [DISTWIDTH] IN BLOOD BY AUTOMATED COUNT: 13.7 % (ref 11.5–14.5)
ERYTHROCYTE [SEDIMENTATION RATE] IN BLOOD BY WESTERGREN METHOD: 1 MM/HR (ref 0–10)
FERRITIN SERPL IA-MCNC: 147 NG/ML (ref 22–322)
FOLATE SERPL-MCNC: 19.7 NG/ML (ref 4.8–24.2)
GFR SERPL CREATININE-BSD FRML MDRD: > 90 ML/MIN/1.73M2
GLUCOSE SERPL-MCNC: 117 MG/DL (ref 70–108)
HBA1C MFR BLD HPLC: 5.5 % (ref 4.4–6.4)
HCT VFR BLD AUTO: 48.1 % (ref 42–52)
HDLC SERPL-MCNC: 48 MG/DL
HGB BLD-MCNC: 15.6 GM/DL (ref 14–18)
IMM GRANULOCYTES # BLD AUTO: 0.04 THOU/MM3 (ref 0–0.07)
IMM GRANULOCYTES NFR BLD AUTO: 0.5 %
IRON SERPL-MCNC: 56 UG/DL (ref 65–195)
LDH SERPL L TO P-CCNC: 143 U/L (ref 100–190)
LDLC SERPL CALC-MCNC: 81 MG/DL
LYMPHOCYTES ABSOLUTE: 2.1 THOU/MM3 (ref 1–4.8)
LYMPHOCYTES NFR BLD AUTO: 25.9 %
MCH RBC QN AUTO: 29 PG (ref 26–33)
MCHC RBC AUTO-ENTMCNC: 32.4 GM/DL (ref 32.2–35.5)
MCV RBC AUTO: 89.4 FL (ref 80–94)
MONOCYTES ABSOLUTE: 0.4 THOU/MM3 (ref 0.4–1.3)
MONOCYTES NFR BLD AUTO: 5.3 %
NEUTROPHILS ABSOLUTE: 5.3 THOU/MM3 (ref 1.8–7.7)
NEUTROPHILS NFR BLD AUTO: 65.2 %
NRBC BLD AUTO-RTO: 0 /100 WBC
PLATELET # BLD AUTO: 278 THOU/MM3 (ref 130–400)
PMV BLD AUTO: 10.6 FL (ref 9.4–12.4)
POTASSIUM SERPL-SCNC: 4.2 MEQ/L (ref 3.5–5.2)
PROT SERPL-MCNC: 7.7 G/DL (ref 6.1–8)
RBC # BLD AUTO: 5.38 MILL/MM3 (ref 4.7–6.1)
SODIUM SERPL-SCNC: 141 MEQ/L (ref 135–145)
T4 FREE SERPL-MCNC: 1.43 NG/DL (ref 0.93–1.68)
TIBC SERPL-MCNC: 320 UG/DL (ref 171–450)
TRIGL SERPL-MCNC: 79 MG/DL (ref 0–199)
TSH SERPL DL<=0.005 MIU/L-ACNC: 0.65 UIU/ML (ref 0.4–4.2)
VIT B12 SERPL-MCNC: 796 PG/ML (ref 211–911)
WBC # BLD AUTO: 8.2 THOU/MM3 (ref 4.8–10.8)

## 2025-02-03 NOTE — TELEPHONE ENCOUNTER
Pt informed of lab results and recommendations . Pt voiced understanding with no further questions at this time.     Pt states he was fasting for his blood work

## 2025-02-03 NOTE — TELEPHONE ENCOUNTER
----- Message from KATINA Poe CNP sent at 2/3/2025  2:45 PM EST -----  Blood sugar is up a little, was he fasting for labs?  His iron levels were a little low but all of his other labs were normal. Recommend eating foods high in iron like red meat/green leafy vegetables.  I will leave this in here for Balwinder gonzalez as well.

## 2025-02-04 LAB
AFP SERPL-MCNC: < 1.8 UG/L
FOLLICLE STIMULATING HORMONE: 5.3 MIU/ML (ref 1.5–12.4)
LUTEINIZING HORMONE: 10.1 MIU/ML (ref 1.7–8.6)

## 2025-02-10 LAB — AFB CULTURE & SMEAR: NORMAL

## 2025-02-17 LAB — AFB CULTURE & SMEAR: NORMAL

## 2025-02-24 LAB
AFB CULTURE & SMEAR: NORMAL
FUNGUS SPEC CULT: NORMAL
FUNGUS SPEC FUNGUS STN: NORMAL

## 2025-03-03 LAB — AFB CULTURE & SMEAR: NORMAL

## 2025-03-10 LAB — AFB CULTURE & SMEAR: NORMAL

## 2025-03-22 DIAGNOSIS — E55.9 VITAMIN D DEFICIENCY: ICD-10-CM

## 2025-03-24 RX ORDER — ERGOCALCIFEROL 1.25 MG/1
50000 CAPSULE, LIQUID FILLED ORAL WEEKLY
Qty: 12 CAPSULE | Refills: 0 | Status: SHIPPED | OUTPATIENT
Start: 2025-03-24

## 2025-03-24 NOTE — TELEPHONE ENCOUNTER
Recent Visits  Date Type Provider Dept   10/24/24 Office Visit Nubia Osborne, APRN - CNP [facility]   07/25/24 Office Visit Nubia Osborne, APRN - CNP [facility]   07/16/24 Office Visit Nubia Osborne, APRN - CNP [facility]   05/22/24 Office Visit Nubia Osborne, APRN - CNP [facility]   11/29/23 Office Visit Nubia Osborne, KATINA - CNP [facility]   Showing recent visits within past 540 days with a meds authorizing provider and meeting all other requirements  Future Appointments  Date Type Provider Dept   03/25/25 Appointment Nubia Osborne APRN - CNP [facility]   Showing future appointments within next 150 days with a meds authorizing provider and meeting all other requirements

## 2025-03-25 ENCOUNTER — OFFICE VISIT (OUTPATIENT)
Dept: FAMILY MEDICINE CLINIC | Age: 35
End: 2025-03-25
Payer: MEDICAID

## 2025-03-25 VITALS
OXYGEN SATURATION: 99 % | WEIGHT: 108.8 LBS | HEIGHT: 62 IN | TEMPERATURE: 97.8 F | HEART RATE: 81 BPM | DIASTOLIC BLOOD PRESSURE: 78 MMHG | RESPIRATION RATE: 12 BRPM | BODY MASS INDEX: 20.02 KG/M2 | SYSTOLIC BLOOD PRESSURE: 118 MMHG

## 2025-03-25 DIAGNOSIS — H53.9 VISION CHANGES: ICD-10-CM

## 2025-03-25 DIAGNOSIS — F11.20 CONTINUOUS OPIOID DEPENDENCE (HCC): ICD-10-CM

## 2025-03-25 DIAGNOSIS — R63.4 UNINTENTIONAL WEIGHT LOSS: Primary | ICD-10-CM

## 2025-03-25 PROCEDURE — 4004F PT TOBACCO SCREEN RCVD TLK: CPT | Performed by: NURSE PRACTITIONER

## 2025-03-25 PROCEDURE — G8427 DOCREV CUR MEDS BY ELIG CLIN: HCPCS | Performed by: NURSE PRACTITIONER

## 2025-03-25 PROCEDURE — G8420 CALC BMI NORM PARAMETERS: HCPCS | Performed by: NURSE PRACTITIONER

## 2025-03-25 PROCEDURE — 99214 OFFICE O/P EST MOD 30 MIN: CPT | Performed by: NURSE PRACTITIONER

## 2025-03-25 SDOH — ECONOMIC STABILITY: FOOD INSECURITY: WITHIN THE PAST 12 MONTHS, THE FOOD YOU BOUGHT JUST DIDN'T LAST AND YOU DIDN'T HAVE MONEY TO GET MORE.: NEVER TRUE

## 2025-03-25 SDOH — ECONOMIC STABILITY: FOOD INSECURITY: WITHIN THE PAST 12 MONTHS, YOU WORRIED THAT YOUR FOOD WOULD RUN OUT BEFORE YOU GOT MONEY TO BUY MORE.: NEVER TRUE

## 2025-03-25 ASSESSMENT — PATIENT HEALTH QUESTIONNAIRE - PHQ9
SUM OF ALL RESPONSES TO PHQ QUESTIONS 1-9: 2
2. FEELING DOWN, DEPRESSED OR HOPELESS: SEVERAL DAYS
1. LITTLE INTEREST OR PLEASURE IN DOING THINGS: SEVERAL DAYS
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
## 2025-03-27 DIAGNOSIS — R11.0 NAUSEA: ICD-10-CM

## 2025-03-28 RX ORDER — ONDANSETRON 4 MG/1
4 TABLET, FILM COATED ORAL EVERY 8 HOURS PRN
Qty: 30 TABLET | Refills: 3 | Status: SHIPPED | OUTPATIENT
Start: 2025-03-28

## 2025-03-28 NOTE — TELEPHONE ENCOUNTER
Recent Visits  Date Type Provider Dept   03/25/25 Office Visit Nubia Osborne, APRN - CNP [facility]   10/24/24 Office Visit Nubia Osborne, APRN - CNP [facility]   07/25/24 Office Visit Nubia Osborne, APRN - CNP [facility]   07/16/24 Office Visit Nubia Osborne, APRN - CNP [facility]   05/22/24 Office Visit Nubia Osborne, APRN - CNP [facility]   11/29/23 Office Visit Nubia Osborne, APRN - CNP [facility]   Showing recent visits within past 540 days with a meds authorizing provider and meeting all other requirements  Future Appointments  No visits were found meeting these conditions.  Showing future appointments within next 150 days with a meds authorizing provider and meeting all other requirements

## 2025-04-11 DIAGNOSIS — M25.50 ARTHRALGIA, UNSPECIFIED JOINT: ICD-10-CM

## 2025-04-11 DIAGNOSIS — E55.9 VITAMIN D DEFICIENCY: ICD-10-CM

## 2025-04-11 DIAGNOSIS — B19.20 HEPATITIS C TEST POSITIVE: ICD-10-CM

## 2025-04-11 DIAGNOSIS — F11.20 CONTINUOUS OPIOID DEPENDENCE (HCC): ICD-10-CM

## 2025-04-11 DIAGNOSIS — R53.83 FATIGUE, UNSPECIFIED TYPE: ICD-10-CM

## 2025-04-11 RX ORDER — GABAPENTIN 300 MG/1
300 CAPSULE ORAL 3 TIMES DAILY
Qty: 270 CAPSULE | Refills: 1 | OUTPATIENT
Start: 2025-04-11 | End: 2025-10-08

## 2025-04-11 RX ORDER — ERGOCALCIFEROL 1.25 MG/1
50000 CAPSULE, LIQUID FILLED ORAL WEEKLY
Qty: 12 CAPSULE | Refills: 0 | OUTPATIENT
Start: 2025-04-11

## 2025-04-11 NOTE — TELEPHONE ENCOUNTER
I called and spoke to Chico and he says that when he picked up  his meds they didn't give them to him so he is going to call the pharmacy and see If they can get them filled for him and also states \"\"what about my gabs\" advised pt that he has a refill at the pharmacy just needs to call them to refill. Pt voiced understanding and had no questions at this time.

## 2025-04-16 DIAGNOSIS — F11.20 CONTINUOUS OPIOID DEPENDENCE (HCC): ICD-10-CM

## 2025-04-16 DIAGNOSIS — R53.83 FATIGUE, UNSPECIFIED TYPE: ICD-10-CM

## 2025-04-16 DIAGNOSIS — M25.50 ARTHRALGIA, UNSPECIFIED JOINT: ICD-10-CM

## 2025-04-16 DIAGNOSIS — B19.20 HEPATITIS C TEST POSITIVE: ICD-10-CM

## 2025-04-16 RX ORDER — GABAPENTIN 300 MG/1
300 CAPSULE ORAL 3 TIMES DAILY
Qty: 270 CAPSULE | Refills: 1 | OUTPATIENT
Start: 2025-04-16 | End: 2025-10-13

## 2025-05-14 DIAGNOSIS — R11.0 NAUSEA: ICD-10-CM

## 2025-05-14 RX ORDER — ONDANSETRON 4 MG/1
4 TABLET, FILM COATED ORAL EVERY 8 HOURS PRN
Qty: 30 TABLET | Refills: 3 | OUTPATIENT
Start: 2025-05-14

## 2025-05-27 ENCOUNTER — SOCIAL WORK (OUTPATIENT)
Dept: INTERNAL MEDICINE CLINIC | Age: 35
End: 2025-05-27

## 2025-05-27 NOTE — PROGRESS NOTES
Patient's mom called to see about possibly switching her and his treatment due to wanting to wean of Medication Assisted Treatment.  Patient is currently on subutex, mom reports that he was switched from suboxone unsure of dosage of either.   Patient and his mother have been clean for 4 yrs. She reports his DOC was heroin and Fentanyl, though she did not report his method and was unsure how much he was using. She did not report any o/d hx for him and his chart does not show a hx. Patient's mother was unable to remember how old patient was when he started using opiates.  He was also given comfort medication by [facility].  More information will be provided at appointment by patient.  No other substance abuse known. He is the source of transportation for both of them. Possibly filing for disability. Patient has 5 daughters and there mom works during the day.   Patient is scheduled with 5/29 @ 1:30p

## 2025-05-29 ENCOUNTER — OFFICE VISIT (OUTPATIENT)
Dept: INTERNAL MEDICINE CLINIC | Age: 35
End: 2025-05-29
Payer: MEDICAID

## 2025-05-29 VITALS
SYSTOLIC BLOOD PRESSURE: 130 MMHG | HEART RATE: 111 BPM | WEIGHT: 110 LBS | HEIGHT: 62 IN | BODY MASS INDEX: 20.24 KG/M2 | RESPIRATION RATE: 20 BRPM | DIASTOLIC BLOOD PRESSURE: 95 MMHG

## 2025-05-29 DIAGNOSIS — F11.20 SEVERE OPIOID USE DISORDER (HCC): Primary | ICD-10-CM

## 2025-05-29 LAB
ALCOHOL URINE: ABNORMAL
AMPHETAMINE SCREEN URINE: ABNORMAL
BARBITURATE SCREEN URINE: ABNORMAL
BENZODIAZEPINE SCREEN, URINE: ABNORMAL
BUPRENORPHINE URINE: ABNORMAL
COCAINE METABOLITE SCREEN URINE: ABNORMAL
FENTANYL SCREEN, URINE: ABNORMAL
GABAPENTIN SCREEN, URINE: ABNORMAL
MDMA, URINE: ABNORMAL
METHADONE SCREEN, URINE: ABNORMAL
METHAMPHETAMINE, URINE: ABNORMAL
OPIATE SCREEN URINE: ABNORMAL
OXYCODONE SCREEN URINE: ABNORMAL
PHENCYCLIDINE SCREEN URINE: ABNORMAL
PROPOXYPHENE SCREEN, URINE: ABNORMAL
SYNTHETIC CANNABINOIDS(K2) SCREEN, URINE: ABNORMAL
THC SCREEN, URINE: ABNORMAL
TRAMADOL SCREEN URINE: ABNORMAL
TRICYCLIC ANTIDEPRESSANTS, UR: ABNORMAL

## 2025-05-29 PROCEDURE — 99214 OFFICE O/P EST MOD 30 MIN: CPT | Performed by: NURSE PRACTITIONER

## 2025-05-29 PROCEDURE — 4004F PT TOBACCO SCREEN RCVD TLK: CPT | Performed by: NURSE PRACTITIONER

## 2025-05-29 PROCEDURE — G8427 DOCREV CUR MEDS BY ELIG CLIN: HCPCS | Performed by: NURSE PRACTITIONER

## 2025-05-29 PROCEDURE — G8420 CALC BMI NORM PARAMETERS: HCPCS | Performed by: NURSE PRACTITIONER

## 2025-05-29 PROCEDURE — 80305 DRUG TEST PRSMV DIR OPT OBS: CPT | Performed by: NURSE PRACTITIONER

## 2025-05-29 RX ORDER — BUPRENORPHINE 2 MG/1
2 TABLET SUBLINGUAL DAILY
COMMUNITY

## 2025-05-29 RX ORDER — CLONIDINE HYDROCHLORIDE 0.1 MG/1
0.1 TABLET ORAL 3 TIMES DAILY
Qty: 21 TABLET | Refills: 0 | Status: SHIPPED | OUTPATIENT
Start: 2025-05-29 | End: 2025-05-29

## 2025-05-29 RX ORDER — QUETIAPINE FUMARATE 100 MG/1
100 TABLET, FILM COATED ORAL NIGHTLY
Qty: 8 TABLET | Refills: 0 | Status: SHIPPED | OUTPATIENT
Start: 2025-05-29 | End: 2025-05-29

## 2025-05-29 RX ORDER — ONDANSETRON 4 MG/1
4 TABLET, ORALLY DISINTEGRATING ORAL 3 TIMES DAILY PRN
Qty: 21 TABLET | Refills: 0 | Status: SHIPPED | OUTPATIENT
Start: 2025-05-29

## 2025-05-29 RX ORDER — QUETIAPINE FUMARATE 100 MG/1
100 TABLET, FILM COATED ORAL NIGHTLY
Qty: 8 TABLET | Refills: 0 | Status: SHIPPED | OUTPATIENT
Start: 2025-05-29 | End: 2025-06-06

## 2025-05-29 RX ORDER — ONDANSETRON 4 MG/1
4 TABLET, ORALLY DISINTEGRATING ORAL 3 TIMES DAILY PRN
Qty: 21 TABLET | Refills: 0 | Status: SHIPPED | OUTPATIENT
Start: 2025-05-29 | End: 2025-05-29

## 2025-05-29 RX ORDER — CLONIDINE HYDROCHLORIDE 0.1 MG/1
0.1 TABLET ORAL 3 TIMES DAILY
Qty: 21 TABLET | Refills: 0 | Status: SHIPPED | OUTPATIENT
Start: 2025-05-29 | End: 2025-06-05

## 2025-05-29 ASSESSMENT — PATIENT HEALTH QUESTIONNAIRE - PHQ9
10. IF YOU CHECKED OFF ANY PROBLEMS, HOW DIFFICULT HAVE THESE PROBLEMS MADE IT FOR YOU TO DO YOUR WORK, TAKE CARE OF THINGS AT HOME, OR GET ALONG WITH OTHER PEOPLE: SOMEWHAT DIFFICULT
2. FEELING DOWN, DEPRESSED OR HOPELESS: NOT AT ALL
SUM OF ALL RESPONSES TO PHQ QUESTIONS 1-9: 7
SUM OF ALL RESPONSES TO PHQ QUESTIONS 1-9: 7
8. MOVING OR SPEAKING SO SLOWLY THAT OTHER PEOPLE COULD HAVE NOTICED. OR THE OPPOSITE, BEING SO FIGETY OR RESTLESS THAT YOU HAVE BEEN MOVING AROUND A LOT MORE THAN USUAL: NOT AT ALL
9. THOUGHTS THAT YOU WOULD BE BETTER OFF DEAD, OR OF HURTING YOURSELF: NOT AT ALL
1. LITTLE INTEREST OR PLEASURE IN DOING THINGS: SEVERAL DAYS
4. FEELING TIRED OR HAVING LITTLE ENERGY: MORE THAN HALF THE DAYS
5. POOR APPETITE OR OVEREATING: MORE THAN HALF THE DAYS
6. FEELING BAD ABOUT YOURSELF - OR THAT YOU ARE A FAILURE OR HAVE LET YOURSELF OR YOUR FAMILY DOWN: MORE THAN HALF THE DAYS
SUM OF ALL RESPONSES TO PHQ QUESTIONS 1-9: 7
3. TROUBLE FALLING OR STAYING ASLEEP: NOT AT ALL
SUM OF ALL RESPONSES TO PHQ QUESTIONS 1-9: 7
7. TROUBLE CONCENTRATING ON THINGS, SUCH AS READING THE NEWSPAPER OR WATCHING TELEVISION: NOT AT ALL

## 2025-05-29 NOTE — PROGRESS NOTES
06/02/25   The patients primary provider is Nubia Osborne APRN - [facility]    Chico Sanchez is a 35 y.o.  male who presents in office today for medication assisted treatment.   Transfer from [facility] in treatment for 4 years    The patient was around the age of 23 when he first started using illicit substances. He used heroin and then progressed to fentanyl. Used out of curiosity and \"hanging out with the wrong people\"     Pt wants to wean off suboxone- states it makes him sick with 30lb weight loss. Switched to subutex with no relief of symptoms.   States he is taking \" a piece \" of subutex 2mg every morning. Towards the end of the night he is experiencing restlessness, nausea, anxiety. Taking zofran. States he has been weaning himself for several months. At this dose for approx 4 months. [facility] only offered him ibuprofen and zofran.   He went 5 days without medication but couldn't tolerate symptoms    Unable to verify buprenorphine orders due to [facility] does not report through [facility]. He reports last seen in April.     Quantity used daily: 500-600 daily  Route of administration: snorting, never used IV  Date and time of last use: June 2023- overdosed-\"that's when I realized if I use again I will die, there is no in between\"   Othersubstances: denies  Psychiatric history: bipolar disorder- dx at age 20- refuses medication  Social hx-lives with his girlfriend- not currently employed  Hepatitis hx: yes- tx with [facility] for hepatitis C- reportedly repeat viral load was negative    Taking gabapentin 300mg only at bedtime.      UDS- Positive for BUP, FENT, THC.  - reportedly last used fentanyl 2 years ago- overdosed at that time.     [facility] reviewed- gabapentin filled on 5/18/25    Discussed at length all Medication Assisted Treatment options.     Plan- take gabapentin tid, zofran prn, clonidine, and seroquel. States he took seroquel in the past for mental health and felt it worked best but his

## 2025-06-02 ASSESSMENT — ENCOUNTER SYMPTOMS
DIARRHEA: 0
VOMITING: 0
COUGH: 0
WHEEZING: 0
CONSTIPATION: 0
NAUSEA: 0
CHEST TIGHTNESS: 0
ABDOMINAL PAIN: 0
SHORTNESS OF BREATH: 0

## 2025-06-11 ENCOUNTER — SOCIAL WORK (OUTPATIENT)
Age: 35
End: 2025-06-11

## 2025-06-11 NOTE — PROGRESS NOTES
Provider requested for sw to contact patient and see how he was doing. Also provider wanted patient to know that his lab results came back from [facility], and he was clean. There was no fentanyl in his system.  Patient was very thankful to here.     Patient admitted to taking a perc, from his brother at his first appointment and was very concerned when he was told it was fentanyl.  Patient states he is doing okay. Patient reports he is still taking a small bit of subutex.     Patient asked to reschedule with provider due to this news.   Roney was able to schedule patient for 9:15A on 6/16.

## 2025-06-17 DIAGNOSIS — B19.20 HEPATITIS C TEST POSITIVE: ICD-10-CM

## 2025-06-17 DIAGNOSIS — F11.20 CONTINUOUS OPIOID DEPENDENCE (HCC): ICD-10-CM

## 2025-06-17 DIAGNOSIS — R53.83 FATIGUE, UNSPECIFIED TYPE: ICD-10-CM

## 2025-06-17 DIAGNOSIS — M25.50 ARTHRALGIA, UNSPECIFIED JOINT: ICD-10-CM

## 2025-06-17 RX ORDER — GABAPENTIN 300 MG/1
300 CAPSULE ORAL 3 TIMES DAILY
Qty: 270 CAPSULE | Refills: 1 | Status: SHIPPED | OUTPATIENT
Start: 2025-06-17 | End: 2025-12-14

## 2025-06-17 NOTE — TELEPHONE ENCOUNTER
I reviewed an OARRS report on patient today.  There were no abnormal findings on the report.  Rx sent  Electronically signed by KATINA Floyd CNP on 6/17/25 at 5:23 PM EDT

## 2025-07-03 DIAGNOSIS — E55.9 VITAMIN D DEFICIENCY: ICD-10-CM

## 2025-07-03 RX ORDER — ERGOCALCIFEROL 1.25 MG/1
50000 CAPSULE, LIQUID FILLED ORAL WEEKLY
Qty: 12 CAPSULE | Refills: 0 | OUTPATIENT
Start: 2025-07-03

## 2025-07-07 DIAGNOSIS — K21.9 GASTROESOPHAGEAL REFLUX DISEASE, UNSPECIFIED WHETHER ESOPHAGITIS PRESENT: ICD-10-CM

## 2025-07-07 RX ORDER — FAMOTIDINE 20 MG/1
20 TABLET, FILM COATED ORAL 2 TIMES DAILY PRN
Qty: 180 TABLET | Refills: 3 | Status: SHIPPED | OUTPATIENT
Start: 2025-07-07

## 2025-07-07 NOTE — TELEPHONE ENCOUNTER
Recent Visits  Date Type Provider Dept   03/25/25 Office Visit Nubia Osborne, APRN - CNP [facility]   10/24/24 Office Visit Nubia Osborne, APRN - CNP [facility]   07/25/24 Office Visit Nubia Osborne, APRN - CNP [facility]   07/16/24 Office Visit Nubia Osborne, APRN - CNP [facility]   05/22/24 Office Visit Nubia Osborne, APRN - CNP [facility]   Showing recent visits within past 540 days with a meds authorizing provider and meeting all other requirements  Future Appointments  No visits were found meeting these conditions.  Showing future appointments within next 150 days with a meds authorizing provider and meeting all other requirements

## 2025-07-09 DIAGNOSIS — F41.9 ANXIETY DISORDER, UNSPECIFIED TYPE: ICD-10-CM

## 2025-07-10 RX ORDER — MIRTAZAPINE 30 MG/1
30 TABLET, FILM COATED ORAL NIGHTLY
Qty: 90 TABLET | Refills: 1 | Status: SHIPPED | OUTPATIENT
Start: 2025-07-10

## 2025-07-10 NOTE — TELEPHONE ENCOUNTER
Recent Visits  Date Type Provider Dept   03/25/25 Office Visit Nubia Osborne, APRN - CNP [facility]   10/24/24 Office Visit Nubia Osborne, APRN - CNP [facility]   07/25/24 Office Visit Nubia Osborne, APRN - CNP [facility]   07/16/24 Office Visit Nubia Osborne, APRN - CNP [facility]   05/22/24 Office Visit Nubia Osborne, APRN - CNP [facility]   Showing recent visits within past 540 days with a meds authorizing provider and meeting all other requirements  Future Appointments  No visits were found meeting these conditions.  Showing future appointments within next 150 days with a meds authorizing provider and meeting all other requirements   Patient comment: Out

## 2025-07-18 DIAGNOSIS — B19.20 HEPATITIS C TEST POSITIVE: ICD-10-CM

## 2025-07-18 DIAGNOSIS — M25.50 ARTHRALGIA, UNSPECIFIED JOINT: ICD-10-CM

## 2025-07-18 DIAGNOSIS — F11.20 CONTINUOUS OPIOID DEPENDENCE (HCC): ICD-10-CM

## 2025-07-18 DIAGNOSIS — R53.83 FATIGUE, UNSPECIFIED TYPE: ICD-10-CM

## 2025-07-21 RX ORDER — GABAPENTIN 300 MG/1
300 CAPSULE ORAL 3 TIMES DAILY
Qty: 90 CAPSULE | Refills: 5 | Status: SHIPPED | OUTPATIENT
Start: 2025-07-21 | End: 2026-01-17

## 2025-07-21 NOTE — TELEPHONE ENCOUNTER
I reviewed an OARRS report on patient today.  There were no abnormal findings on the report.  Rx sent  Electronically signed by KATINA Floyd CNP on 7/21/25 at 8:36 AM EDT\

## 2025-08-17 DIAGNOSIS — R53.83 FATIGUE, UNSPECIFIED TYPE: ICD-10-CM

## 2025-08-17 DIAGNOSIS — R11.0 NAUSEA: ICD-10-CM

## 2025-08-17 DIAGNOSIS — B19.20 HEPATITIS C TEST POSITIVE: ICD-10-CM

## 2025-08-17 DIAGNOSIS — F11.20 CONTINUOUS OPIOID DEPENDENCE (HCC): ICD-10-CM

## 2025-08-17 DIAGNOSIS — M25.50 ARTHRALGIA, UNSPECIFIED JOINT: ICD-10-CM

## 2025-08-18 ENCOUNTER — TELEPHONE (OUTPATIENT)
Dept: FAMILY MEDICINE CLINIC | Age: 35
End: 2025-08-18

## 2025-08-18 RX ORDER — ONDANSETRON 4 MG/1
4 TABLET, FILM COATED ORAL EVERY 8 HOURS PRN
Qty: 30 TABLET | Refills: 3 | Status: SHIPPED | OUTPATIENT
Start: 2025-08-18

## 2025-08-18 RX ORDER — GABAPENTIN 300 MG/1
300 CAPSULE ORAL 3 TIMES DAILY
Qty: 90 CAPSULE | Refills: 5 | Status: SHIPPED | OUTPATIENT
Start: 2025-08-18 | End: 2026-02-14

## (undated) DEVICE — DRAIN SURG PENROSE 0.25X18 IN CLOSED WND DRAINAGE PREM SIL

## (undated) DEVICE — BINDER ABD SM M W12IN CIRC 30 45IN 4 PNL E CNTCT CLSR POSTOP

## (undated) DEVICE — TIP COVER ACCESSORY

## (undated) DEVICE — BANDAGE COMPR W6INXL5YD WHT BGE POLY COT M E WRP WV HK AND

## (undated) DEVICE — ARM DRAPE

## (undated) DEVICE — SEAL

## (undated) DEVICE — FLEXIBLE ENDOSCOPE AND SPECIMEN SAMPLING SYSTEM: Brand: ASCOPE™ 5 BRONCHO HD 5.6/2.8 SAMPLER SET

## (undated) DEVICE — TROCAR: Brand: KII FIOS FIRST ENTRY

## (undated) DEVICE — BASIC SINGLE BASIN BTC-LF: Brand: MEDLINE INDUSTRIES, INC.

## (undated) DEVICE — ETHICON LINX ESOPHAGUS SIZING TOOL: Brand: ETHICON LINX ESOPHAGUS SIZING TOOL

## (undated) DEVICE — GENERAL LAPAROSCOPY-LF: Brand: MEDLINE INDUSTRIES, INC.

## (undated) DEVICE — SYRINGE MED 30ML STD CLR PLAS LUERLOCK TIP N CTRL DISP

## (undated) DEVICE — INSUFFLATION NEEDLE TO ESTABLISH PNEUMOPERITONEUM.: Brand: INSUFFLATION NEEDLE

## (undated) DEVICE — GOWN,SIRUS,NON REINFRCD,LARGE,SET IN SL: Brand: MEDLINE

## (undated) DEVICE — COLUMN DRAPE

## (undated) DEVICE — PUMP SUC IRR TBNG L10FT W/ HNDPC ASSEMB STRYKEFLOW 2

## (undated) DEVICE — SUTURE VICRYL + SZ 2-0 L27IN ABSRB WHT SH 1/2 CIR TAPERCUT VCP417H

## (undated) DEVICE — SUTURE VICRYL + SZ 0 L27IN ABSRB VLT L26MM UR-6 5/8 CIR VCP603H

## (undated) DEVICE — SUTURE VICRYL + SZ 3-0 L27IN ABSRB UD L26MM SH 1/2 CIR VCP416H

## (undated) DEVICE — GLOVE ORANGE PI 8   MSG9080

## (undated) DEVICE — ELECTRO LUBE IS A SINGLE PATIENT USE DEVICE THAT IS INTENDED TO BE USED ON ELECTROSURGICAL ELECTRODES TO REDUCE STICKING.: Brand: KEY SURGICAL ELECTRO LUBE

## (undated) DEVICE — GLOVE ORANGE PI 7   MSG9070

## (undated) DEVICE — SUTURE ETHIBOND EXCEL SZ 0 L36IN NONABSORBABLE GRN SH L26MM X524H

## (undated) DEVICE — BANDAGE ADH W1XL3IN NAT FAB WVN FLX DURABLE N ADH PD SEAL

## (undated) DEVICE — TUBING INSUFFLATOR HEAT HUMIDIFIED SMK EVAC SET PNEUMOCLEAR

## (undated) DEVICE — BLADELESS OBTURATOR: Brand: WECK VISTA